# Patient Record
Sex: MALE | Race: OTHER | HISPANIC OR LATINO | URBAN - METROPOLITAN AREA
[De-identification: names, ages, dates, MRNs, and addresses within clinical notes are randomized per-mention and may not be internally consistent; named-entity substitution may affect disease eponyms.]

---

## 2021-04-27 ENCOUNTER — INPATIENT (INPATIENT)
Facility: HOSPITAL | Age: 86
LOS: 3 days | Discharge: HOME | DRG: 312 | End: 2021-05-01
Attending: INTERNAL MEDICINE | Admitting: INTERNAL MEDICINE
Payer: MEDICARE

## 2021-04-27 VITALS
TEMPERATURE: 97 F | HEART RATE: 63 BPM | SYSTOLIC BLOOD PRESSURE: 149 MMHG | WEIGHT: 164.91 LBS | DIASTOLIC BLOOD PRESSURE: 72 MMHG | RESPIRATION RATE: 18 BRPM | OXYGEN SATURATION: 96 %

## 2021-04-27 DIAGNOSIS — U07.1 COVID-19: ICD-10-CM

## 2021-04-27 DIAGNOSIS — R55 SYNCOPE AND COLLAPSE: ICD-10-CM

## 2021-04-27 DIAGNOSIS — I10 ESSENTIAL (PRIMARY) HYPERTENSION: ICD-10-CM

## 2021-04-27 DIAGNOSIS — Z29.9 ENCOUNTER FOR PROPHYLACTIC MEASURES, UNSPECIFIED: ICD-10-CM

## 2021-04-27 LAB
ALBUMIN SERPL ELPH-MCNC: 3.5 G/DL — SIGNIFICANT CHANGE UP (ref 3.5–5)
ALP SERPL-CCNC: 76 U/L — SIGNIFICANT CHANGE UP (ref 40–120)
ALT FLD-CCNC: 31 U/L DA — SIGNIFICANT CHANGE UP (ref 10–60)
ANION GAP SERPL CALC-SCNC: 7 MMOL/L — SIGNIFICANT CHANGE UP (ref 5–17)
APTT BLD: 30.6 SEC — SIGNIFICANT CHANGE UP (ref 27.5–35.5)
AST SERPL-CCNC: 37 U/L — SIGNIFICANT CHANGE UP (ref 10–40)
BASOPHILS # BLD AUTO: 0.01 K/UL — SIGNIFICANT CHANGE UP (ref 0–0.2)
BASOPHILS NFR BLD AUTO: 0.3 % — SIGNIFICANT CHANGE UP (ref 0–2)
BILIRUB SERPL-MCNC: 0.3 MG/DL — SIGNIFICANT CHANGE UP (ref 0.2–1.2)
BUN SERPL-MCNC: 18 MG/DL — SIGNIFICANT CHANGE UP (ref 7–18)
CALCIUM SERPL-MCNC: 8.3 MG/DL — LOW (ref 8.4–10.5)
CHLORIDE SERPL-SCNC: 102 MMOL/L — SIGNIFICANT CHANGE UP (ref 96–108)
CO2 SERPL-SCNC: 27 MMOL/L — SIGNIFICANT CHANGE UP (ref 22–31)
CREAT SERPL-MCNC: 1.04 MG/DL — SIGNIFICANT CHANGE UP (ref 0.5–1.3)
EOSINOPHIL # BLD AUTO: 0 K/UL — SIGNIFICANT CHANGE UP (ref 0–0.5)
EOSINOPHIL NFR BLD AUTO: 0 % — SIGNIFICANT CHANGE UP (ref 0–6)
GLUCOSE SERPL-MCNC: 101 MG/DL — HIGH (ref 70–99)
HCT VFR BLD CALC: 44.5 % — SIGNIFICANT CHANGE UP (ref 39–50)
HGB BLD-MCNC: 14.6 G/DL — SIGNIFICANT CHANGE UP (ref 13–17)
IMM GRANULOCYTES NFR BLD AUTO: 0.5 % — SIGNIFICANT CHANGE UP (ref 0–1.5)
INR BLD: 1.13 RATIO — SIGNIFICANT CHANGE UP (ref 0.88–1.16)
LYMPHOCYTES # BLD AUTO: 0.86 K/UL — LOW (ref 1–3.3)
LYMPHOCYTES # BLD AUTO: 21.6 % — SIGNIFICANT CHANGE UP (ref 13–44)
MCHC RBC-ENTMCNC: 30.7 PG — SIGNIFICANT CHANGE UP (ref 27–34)
MCHC RBC-ENTMCNC: 32.8 GM/DL — SIGNIFICANT CHANGE UP (ref 32–36)
MCV RBC AUTO: 93.7 FL — SIGNIFICANT CHANGE UP (ref 80–100)
MONOCYTES # BLD AUTO: 0.57 K/UL — SIGNIFICANT CHANGE UP (ref 0–0.9)
MONOCYTES NFR BLD AUTO: 14.3 % — HIGH (ref 2–14)
NEUTROPHILS # BLD AUTO: 2.52 K/UL — SIGNIFICANT CHANGE UP (ref 1.8–7.4)
NEUTROPHILS NFR BLD AUTO: 63.3 % — SIGNIFICANT CHANGE UP (ref 43–77)
NRBC # BLD: 0 /100 WBCS — SIGNIFICANT CHANGE UP (ref 0–0)
PLATELET # BLD AUTO: 186 K/UL — SIGNIFICANT CHANGE UP (ref 150–400)
POTASSIUM SERPL-MCNC: 3.3 MMOL/L — LOW (ref 3.5–5.3)
POTASSIUM SERPL-SCNC: 3.3 MMOL/L — LOW (ref 3.5–5.3)
PROT SERPL-MCNC: 7.4 G/DL — SIGNIFICANT CHANGE UP (ref 6–8.3)
PROTHROM AB SERPL-ACNC: 13.4 SEC — SIGNIFICANT CHANGE UP (ref 10.6–13.6)
RBC # BLD: 4.75 M/UL — SIGNIFICANT CHANGE UP (ref 4.2–5.8)
RBC # FLD: 13.5 % — SIGNIFICANT CHANGE UP (ref 10.3–14.5)
SARS-COV-2 RNA SPEC QL NAA+PROBE: DETECTED
SODIUM SERPL-SCNC: 136 MMOL/L — SIGNIFICANT CHANGE UP (ref 135–145)
TROPONIN I SERPL-MCNC: 0.03 NG/ML — SIGNIFICANT CHANGE UP (ref 0–0.04)
WBC # BLD: 3.98 K/UL — SIGNIFICANT CHANGE UP (ref 3.8–10.5)
WBC # FLD AUTO: 3.98 K/UL — SIGNIFICANT CHANGE UP (ref 3.8–10.5)

## 2021-04-27 PROCEDURE — 70450 CT HEAD/BRAIN W/O DYE: CPT | Mod: 26,MA

## 2021-04-27 PROCEDURE — 99285 EMERGENCY DEPT VISIT HI MDM: CPT

## 2021-04-27 PROCEDURE — 71045 X-RAY EXAM CHEST 1 VIEW: CPT | Mod: 26

## 2021-04-27 PROCEDURE — 99223 1ST HOSP IP/OBS HIGH 75: CPT

## 2021-04-27 RX ORDER — ENOXAPARIN SODIUM 100 MG/ML
40 INJECTION SUBCUTANEOUS DAILY
Refills: 0 | Status: DISCONTINUED | OUTPATIENT
Start: 2021-04-27 | End: 2021-05-01

## 2021-04-27 RX ORDER — TETANUS TOXOID, REDUCED DIPHTHERIA TOXOID AND ACELLULAR PERTUSSIS VACCINE, ADSORBED 5; 2.5; 8; 8; 2.5 [IU]/.5ML; [IU]/.5ML; UG/.5ML; UG/.5ML; UG/.5ML
0.5 SUSPENSION INTRAMUSCULAR ONCE
Refills: 0 | Status: COMPLETED | OUTPATIENT
Start: 2021-04-27 | End: 2021-04-27

## 2021-04-27 RX ADMIN — TETANUS TOXOID, REDUCED DIPHTHERIA TOXOID AND ACELLULAR PERTUSSIS VACCINE, ADSORBED 0.5 MILLILITER(S): 5; 2.5; 8; 8; 2.5 SUSPENSION INTRAMUSCULAR at 17:35

## 2021-04-27 RX ADMIN — ENOXAPARIN SODIUM 40 MILLIGRAM(S): 100 INJECTION SUBCUTANEOUS at 22:00

## 2021-04-27 NOTE — H&P ADULT - NSHPPHYSICALEXAM_GEN_ALL_CORE
Vital Signs (24 Hrs):  T(C): 36.7 (04-27-21 @ 22:01), Max: 36.7 (04-27-21 @ 22:01)  HR: 80 (04-27-21 @ 22:01) (63 - 80)  BP: 140/70 (04-27-21 @ 22:01) (140/70 - 149/72)  RR: 17 (04-27-21 @ 22:01) (17 - 18)  SpO2: 98% (04-27-21 @ 22:01) (96% - 98%)

## 2021-04-27 NOTE — H&P ADULT - PROBLEM SELECTOR PLAN 2
Was found to be covid positive  No symptoms at present  Will send in inflammatory markers  monitor respiratory status

## 2021-04-27 NOTE — H&P ADULT - ATTENDING COMMENTS
Patient is an 85 year old male with a PMH of HTN who was BIBEMS due to syncope.  ( ID: [id number])  Patient states that as he was sitting in a chair at home watching television, he abruptly lost consciousness and fell to the floor.  Patient endorses that his cousin witnessed the entire event and that he was "asleep" for approximately 10 seconds.  Patient denies any bowel/bladder incontinence.    At time of examination in the ED, patient denies any headache, dizziness, chest pain, palpitations, shortness of breath, abdominal pain, nausea/vomiting/diarrhea.    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 No mass effect or hemorrhage is seen. Moderate microvascular type white matter changes are seen.  -Orthostatic Vital Signs  -Will send Prolactin, CK level  -TTE (with attention to mPAP)  -Patient would likely benefit from MRI/MRA Brain  -Will ask Neurology to evaluate patient for further recommendations    Hypokalemia:  -Will continue to replete as necessary and will recheck    Non-Severe COVID Infection:  -COVID= Detected  -At time of examination in the ED, patient is not requiring supplemental oxygen.  Therefore, patient can be categorized as Non-Severe Disease.    -Will start patient on Lovenox  -Will also start patient on Albuterol MDI Q2H PRN  -Awaiting ESR, CRP, Procalcitonin to determine need for antimicrobials, though will hold for now  -Will continue to trend d-dimer, CRP, LDH, Troponin, Ferritin, CPK  -Tylenol PRN for fever  -Will continue to provide patient with any and all additional supportive care as necessary  -Will ask Infectious Disease to evaluate patient for further recommendations, such as Remdesivir and/or steroids    Hypocalcemia:  -Will send Parathyroid Level, Ionized Calcium, Vitamin D    HTN:  -Will resume patient's home medications  -Vital Signs Q4H    GI/DVT PPx:  -Heparin  -Pepcid Patient is an 85 year old male with a PMH of HTN who was BIBEMS due to syncope.  ( ID: [id number])  Patient states that as he was sitting in a chair at home watching television, he abruptly lost consciousness and fell to the floor.  Patient endorses that his cousin witnessed the entire event and that he was "asleep" for approximately 10 seconds.  Patient denies any bowel/bladder incontinence, tonic/clonic activity.    At time of examination in the ED, patient denies any headache, dizziness, chest pain, palpitations, shortness of breath, abdominal pain, nausea/vomiting/diarrhea.    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 No mass effect or hemorrhage is seen. Moderate microvascular type white matter changes are seen.  -Orthostatic Vital Signs  -Will send Prolactin, CK level  -TTE (with attention to mPAP)  -Patient would likely benefit from MRI/MRA Brain  -Will ask Neurology to evaluate patient for further recommendations    Hypokalemia:  -Will continue to replete as necessary and will recheck    Non-Severe COVID Infection:  -COVID= Detected  -At time of examination in the ED, patient is not requiring supplemental oxygen.  Therefore, patient can be categorized as Non-Severe Disease.    -Will start patient on Lovenox  -Will also start patient on Albuterol MDI Q2H PRN  -Awaiting ESR, CRP, Procalcitonin to determine need for antimicrobials, though will hold for now  -Will continue to trend d-dimer, CRP, LDH, Troponin, Ferritin, CPK  -Tylenol PRN for fever  -Will continue to provide patient with any and all additional supportive care as necessary  -Will ask Infectious Disease to evaluate patient for further recommendations, such as Remdesivir and/or steroids    Hypocalcemia:  -Will send Parathyroid Level, Ionized Calcium, Vitamin D    HTN:  -Will resume patient's home medications  -Vital Signs Q4H    GI/DVT PPx:  -Heparin  -Pepcid

## 2021-04-27 NOTE — ED PROVIDER NOTE - CLINICAL SUMMARY MEDICAL DECISION MAKING FREE TEXT BOX
Patient presenting for syncope. noting head injury., will obtain lab, ct, assess for acs, ed obs and reassess

## 2021-04-27 NOTE — ED PROVIDER NOTE - OBJECTIVE STATEMENT
interpretor id [id number]: 85 y.o w/ pmh of htn presenting s/p syncope. endorses that he was sitting when he lost conciousness. required his nephew to carry him. denies n, v, cp, headache, focal weakness, numbness.

## 2021-04-27 NOTE — ED ADULT NURSE NOTE - OBJECTIVE STATEMENT
Patient states he had a syncopal episode at home. Currently alert and oriented.  denies any pain. abrasion to left side of head, right elbow and right wrist. Patient only endorses HTN medication.

## 2021-04-27 NOTE — H&P ADULT - HISTORY OF PRESENT ILLNESS
85 year old male with medical history significant of HTN who was BIB EMS due to syncope. Patient states that as he was sitting in a chair at home watching television, he abruptly lost consciousness and fell to the floor. He states that he passed out probably for few seconds. Patient endorses that his cousin witnessed the entire event and that he was "asleep" for approximately 10 seconds. Denies any prodromal symptoms like chest pain, palpitations, dizziness, headache, blurry vision, nausea, vomiting. Patient denies any bowel/bladder incontinence, tonic/clonic activity. Denies fever, abdominal pain, dyspnea, diarrhea, urinary problems.

## 2021-04-27 NOTE — H&P ADULT - PROBLEM SELECTOR PLAN 1
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 No mass effect or hemorrhage is seen. 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 No mass effect or hemorrhage is seen. Moderate microvascular type white matter changes are seen  Admit to tele  Will f/u t2  orthostatic positive  Will do echocardiogram

## 2021-04-27 NOTE — H&P ADULT - ASSESSMENT
85 year old male with medical history significant of HTN who was BIB EMS due to syncope.      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 No mass effect or hemorrhage is seen. Moderate microvascular type white matter changes are seen  COVID positive    Patient is being admitted to [facility] for syncope evaluation

## 2021-04-28 DIAGNOSIS — I95.1 ORTHOSTATIC HYPOTENSION: ICD-10-CM

## 2021-04-28 LAB
24R-OH-CALCIDIOL SERPL-MCNC: 15.7 NG/ML — LOW (ref 30–80)
A1C WITH ESTIMATED AVERAGE GLUCOSE RESULT: 5.8 % — HIGH (ref 4–5.6)
ALBUMIN SERPL ELPH-MCNC: 3.2 G/DL — LOW (ref 3.5–5)
ALP SERPL-CCNC: 74 U/L — SIGNIFICANT CHANGE UP (ref 40–120)
ALT FLD-CCNC: 32 U/L DA — SIGNIFICANT CHANGE UP (ref 10–60)
ANION GAP SERPL CALC-SCNC: 11 MMOL/L — SIGNIFICANT CHANGE UP (ref 5–17)
AST SERPL-CCNC: 33 U/L — SIGNIFICANT CHANGE UP (ref 10–40)
BASOPHILS # BLD AUTO: 0 K/UL — SIGNIFICANT CHANGE UP (ref 0–0.2)
BASOPHILS NFR BLD AUTO: 0 % — SIGNIFICANT CHANGE UP (ref 0–2)
BILIRUB SERPL-MCNC: 0.3 MG/DL — SIGNIFICANT CHANGE UP (ref 0.2–1.2)
BUN SERPL-MCNC: 19 MG/DL — HIGH (ref 7–18)
CA-I BLD-SCNC: 1.1 MMOL/L — LOW (ref 1.12–1.3)
CALCIUM SERPL-MCNC: 8.2 MG/DL — LOW (ref 8.4–10.5)
CALCIUM SERPL-MCNC: 8.5 MG/DL — SIGNIFICANT CHANGE UP (ref 8.4–10.5)
CHLORIDE SERPL-SCNC: 100 MMOL/L — SIGNIFICANT CHANGE UP (ref 96–108)
CHOLEST SERPL-MCNC: 145 MG/DL — SIGNIFICANT CHANGE UP
CO2 SERPL-SCNC: 27 MMOL/L — SIGNIFICANT CHANGE UP (ref 22–31)
COVID-19 SPIKE DOMAIN AB INTERP: POSITIVE
COVID-19 SPIKE DOMAIN ANTIBODY RESULT: 8.12 U/ML — HIGH
CREAT SERPL-MCNC: 0.9 MG/DL — SIGNIFICANT CHANGE UP (ref 0.5–1.3)
CRP SERPL-MCNC: 7 MG/L — HIGH
D DIMER BLD IA.RAPID-MCNC: 224 NG/ML DDU — SIGNIFICANT CHANGE UP
EOSINOPHIL # BLD AUTO: 0.01 K/UL — SIGNIFICANT CHANGE UP (ref 0–0.5)
EOSINOPHIL NFR BLD AUTO: 0.2 % — SIGNIFICANT CHANGE UP (ref 0–6)
ERYTHROCYTE [SEDIMENTATION RATE] IN BLOOD: 8 MM/HR — SIGNIFICANT CHANGE UP (ref 0–20)
ESTIMATED AVERAGE GLUCOSE: 120 MG/DL — HIGH (ref 68–114)
FERRITIN SERPL-MCNC: 186 NG/ML — SIGNIFICANT CHANGE UP (ref 30–400)
FOLATE SERPL-MCNC: 16.3 NG/ML — SIGNIFICANT CHANGE UP
GLUCOSE SERPL-MCNC: 107 MG/DL — HIGH (ref 70–99)
HCT VFR BLD CALC: 44.6 % — SIGNIFICANT CHANGE UP (ref 39–50)
HDLC SERPL-MCNC: 43 MG/DL — SIGNIFICANT CHANGE UP
HGB BLD-MCNC: 14.6 G/DL — SIGNIFICANT CHANGE UP (ref 13–17)
IMM GRANULOCYTES NFR BLD AUTO: 0.4 % — SIGNIFICANT CHANGE UP (ref 0–1.5)
INR BLD: 1.15 RATIO — SIGNIFICANT CHANGE UP (ref 0.88–1.16)
LIPID PNL WITH DIRECT LDL SERPL: 88 MG/DL — SIGNIFICANT CHANGE UP
LYMPHOCYTES # BLD AUTO: 0.74 K/UL — LOW (ref 1–3.3)
LYMPHOCYTES # BLD AUTO: 15.8 % — SIGNIFICANT CHANGE UP (ref 13–44)
MAGNESIUM SERPL-MCNC: 2.2 MG/DL — SIGNIFICANT CHANGE UP (ref 1.6–2.6)
MCHC RBC-ENTMCNC: 30.4 PG — SIGNIFICANT CHANGE UP (ref 27–34)
MCHC RBC-ENTMCNC: 32.7 GM/DL — SIGNIFICANT CHANGE UP (ref 32–36)
MCV RBC AUTO: 92.9 FL — SIGNIFICANT CHANGE UP (ref 80–100)
MONOCYTES # BLD AUTO: 0.56 K/UL — SIGNIFICANT CHANGE UP (ref 0–0.9)
MONOCYTES NFR BLD AUTO: 11.9 % — SIGNIFICANT CHANGE UP (ref 2–14)
NEUTROPHILS # BLD AUTO: 3.36 K/UL — SIGNIFICANT CHANGE UP (ref 1.8–7.4)
NEUTROPHILS NFR BLD AUTO: 71.7 % — SIGNIFICANT CHANGE UP (ref 43–77)
NON HDL CHOLESTEROL: 102 MG/DL — SIGNIFICANT CHANGE UP
NRBC # BLD: 0 /100 WBCS — SIGNIFICANT CHANGE UP (ref 0–0)
PHOSPHATE SERPL-MCNC: 4.1 MG/DL — SIGNIFICANT CHANGE UP (ref 2.5–4.5)
PLATELET # BLD AUTO: 151 K/UL — SIGNIFICANT CHANGE UP (ref 150–400)
POTASSIUM SERPL-MCNC: 3.7 MMOL/L — SIGNIFICANT CHANGE UP (ref 3.5–5.3)
POTASSIUM SERPL-SCNC: 3.7 MMOL/L — SIGNIFICANT CHANGE UP (ref 3.5–5.3)
PROT SERPL-MCNC: 7.4 G/DL — SIGNIFICANT CHANGE UP (ref 6–8.3)
PROTHROM AB SERPL-ACNC: 13.6 SEC — SIGNIFICANT CHANGE UP (ref 10.6–13.6)
PTH-INTACT FLD-MCNC: 87 PG/ML — HIGH (ref 15–65)
RBC # BLD: 4.8 M/UL — SIGNIFICANT CHANGE UP (ref 4.2–5.8)
RBC # FLD: 13.4 % — SIGNIFICANT CHANGE UP (ref 10.3–14.5)
SARS-COV-2 IGG+IGM SERPL QL IA: 8.12 U/ML — HIGH
SARS-COV-2 IGG+IGM SERPL QL IA: POSITIVE
SODIUM SERPL-SCNC: 138 MMOL/L — SIGNIFICANT CHANGE UP (ref 135–145)
TRIGL SERPL-MCNC: 68 MG/DL — SIGNIFICANT CHANGE UP
TROPONIN I SERPL-MCNC: 0.03 NG/ML — SIGNIFICANT CHANGE UP (ref 0–0.04)
TSH SERPL-MCNC: 0.54 UU/ML — SIGNIFICANT CHANGE UP (ref 0.34–4.82)
VIT B12 SERPL-MCNC: <150 PG/ML — LOW (ref 232–1245)
WBC # BLD: 4.69 K/UL — SIGNIFICANT CHANGE UP (ref 3.8–10.5)
WBC # FLD AUTO: 4.69 K/UL — SIGNIFICANT CHANGE UP (ref 3.8–10.5)

## 2021-04-28 PROCEDURE — 99223 1ST HOSP IP/OBS HIGH 75: CPT

## 2021-04-28 PROCEDURE — 93880 EXTRACRANIAL BILAT STUDY: CPT | Mod: 26

## 2021-04-28 PROCEDURE — 99232 SBSQ HOSP IP/OBS MODERATE 35: CPT | Mod: GC

## 2021-04-28 RX ORDER — SODIUM CHLORIDE 9 MG/ML
1000 INJECTION INTRAMUSCULAR; INTRAVENOUS; SUBCUTANEOUS
Refills: 0 | Status: DISCONTINUED | OUTPATIENT
Start: 2021-04-28 | End: 2021-04-28

## 2021-04-28 RX ORDER — SODIUM CHLORIDE 9 MG/ML
1000 INJECTION INTRAMUSCULAR; INTRAVENOUS; SUBCUTANEOUS
Refills: 0 | Status: DISCONTINUED | OUTPATIENT
Start: 2021-04-28 | End: 2021-04-29

## 2021-04-28 RX ORDER — PREGABALIN 225 MG/1
1000 CAPSULE ORAL DAILY
Refills: 0 | Status: DISCONTINUED | OUTPATIENT
Start: 2021-04-28 | End: 2021-04-29

## 2021-04-28 RX ORDER — CHOLECALCIFEROL (VITAMIN D3) 125 MCG
2000 CAPSULE ORAL DAILY
Refills: 0 | Status: DISCONTINUED | OUTPATIENT
Start: 2021-04-28 | End: 2021-04-29

## 2021-04-28 RX ORDER — HYDROCHLOROTHIAZIDE 25 MG
25 TABLET ORAL DAILY
Refills: 0 | Status: DISCONTINUED | OUTPATIENT
Start: 2021-04-28 | End: 2021-04-29

## 2021-04-28 RX ORDER — SODIUM CHLORIDE 9 MG/ML
1000 INJECTION, SOLUTION INTRAVENOUS
Refills: 0 | Status: DISCONTINUED | OUTPATIENT
Start: 2021-04-28 | End: 2021-04-28

## 2021-04-28 RX ADMIN — SODIUM CHLORIDE 75 MILLILITER(S): 9 INJECTION INTRAMUSCULAR; INTRAVENOUS; SUBCUTANEOUS at 10:01

## 2021-04-28 RX ADMIN — ENOXAPARIN SODIUM 40 MILLIGRAM(S): 100 INJECTION SUBCUTANEOUS at 11:39

## 2021-04-28 RX ADMIN — SODIUM CHLORIDE 75 MILLILITER(S): 9 INJECTION, SOLUTION INTRAVENOUS at 06:05

## 2021-04-28 NOTE — CONSULT NOTE ADULT - SUBJECTIVE AND OBJECTIVE BOX
+++++++++++++++++++++++++NOTE NOT COMPLETED++++++++++++++++++++++++++++++++++++++++ +++++++++++++++++++++++++NOTE NOT COMPLETED++++++++++++++++++++++++++++++++++++++++      Patient is a 85y old  Male who presents with a chief complaint of Syncope (28 Apr 2021 10:22)      HPI:  85 year old male with medical history significant of HTN who was BIB EMS due to syncope. Patient states that as he was sitting in a chair at home watching television, he abruptly lost consciousness and fell to the floor. He states that he passed out probably for few seconds. Patient endorses that his cousin witnessed the entire event and that he was "asleep" for approximately 10 seconds. Denies any prodromal symptoms like chest pain, palpitations, dizziness, headache, blurry vision, nausea, vomiting. Patient denies any bowel/bladder incontinence, tonic/clonic activity. Denies fever, abdominal pain, dyspnea, diarrhea, urinary problems.  (27 Apr 2021 22:11)          The patient was last know well at   The patient lives at home/ NH  The patient walks without assistance/ with a cane or walker    Neurological Review of Systems:  No difficulty with language.  No vision loss or double vision.  No dizziness, vertigo or new hearing loss.  No difficulty with speech or swallowing.  No focal weakness.  No focal sensory changes.  No numbness or tingling in the bilateral lower extremities.  No difficulty with balance.  No difficulty with ambulation.      MEDICATIONS  (STANDING):  enoxaparin Injectable 40 milliGRAM(s) SubCutaneous daily  hydrochlorothiazide 25 milliGRAM(s) Oral daily  sodium chloride 0.9%. 1000 milliLiter(s) (75 mL/Hr) IV Continuous <Continuous>    MEDICATIONS  (PRN):    Allergies    No Known Allergies    Intolerances      PAST MEDICAL & SURGICAL HISTORY:  HTN (hypertension)      FAMILY HISTORY:    SOCIAL HISTORY: non smoker/ former smoker/ active smoker    Review of Systems:  Constitutional: No generalized weakness. No fevers or chills.                    Eyes, Ears, Mouth, Throat: No vision loss   Respiratory: No shortness of breath or cough.                                Cardiovascular: No chest pain or palpitations  Gastrointestinal: No nausea or vomiting.                                         Genitourinary: No urinary incontinence or burning on urination.  Musculoskeletal: No joint pain.                                                           Dermatologic: No rash.  Neurological: as per HPI                                                                      Psychiatric: No behavioral problems.  Endocrine: No known hypoglycemia.               Hematologic/Lymphatic: No easy bleeding.    O:  Vital Signs Last 24 Hrs  T(C): 36.9 (28 Apr 2021 07:49), Max: 37.6 (27 Apr 2021 23:40)  T(F): 98.5 (28 Apr 2021 07:49), Max: 99.7 (27 Apr 2021 23:40)  HR: 58 (28 Apr 2021 07:49) (58 - 80)  BP: 146/74 (28 Apr 2021 07:49) (140/70 - 167/73)  BP(mean): --  RR: 18 (28 Apr 2021 07:49) (17 - 18)  SpO2: 96% (28 Apr 2021 07:49) (96% - 99%)    General Exam:   General appearance: No acute distress                 Cardiovascular: Pedal dorsalis pulses intact bilaterally    Neurological Exam:  NIH Stroke Scale (NIHSS):   1a. LOConscious:  0-alert 1-lethargy 2-obtund 3-coma:    _____  1b. LOC Questions:  0-both 1-one 2-none                       _____  1c. LOC Commands:  0-both 1-one 2-none                     _____  2.   Gaze:  0-nl 1-partial 2-conjugate                                _____  3.   Visual:  0-nl 1-part dacia 2-full dacia 3-bilat dacia         _____  4.   Facial Palsy:  0-nl 1-minor 2-part 3-complete             _____  5.   Motor Arm:  0-nl 1-drift 2-effort 3-no effort         Left             _____                              4-no move UN-amputated                     Right  _____  6.   Motor Leg:                                                                 Left   _____                                                                                   Right _____  7.   Ataxia:  0-nl 1-one limb 2-two UN-amp                      _____  8.   Sensory:  0-nl 1-mild 2-severe                                  _____  9.   Language:  0-nl 1-mild 2-severe 3-mute                     _____  10.  Dysarthria:  0-nl 1-mild 2-severe 3-barrier                  _____  11.  Extinction/Inattention:  0-nl 1-mild 2-deep                 _____         TOTAL NIHSS       ________    Mental Status: Oriented to self, date and place.  Attention intact.  No dysarthria, aphasia or neglect.  Knowledge intact.  Registration intact.  Short and long term memory grossly intact.      Cranial Nerves: CN I - not tested.  PERRL, EOMI, VFF, no nystagmus or diplopia.  No APD.  Fundi not visualized bilaterally.  CN V1-3 intact to light touch and pinprick.  No facial asymmetry.  Hearing intact to finger rub bilaterally.  Tongue, uvula and palate midline.  Sternocleidomastoid and Trapezius intact bilaterally.    Motor:   Tone: normal.                  Strength intact throughout  No pronator drift bilaterally                      No dysmetria on finger-nose-finger or heel-shin-heel  No truncal ataxia.  No resting, postural or action tremor.  No myoclonus.    Sensation: intact to light touch, pinprick, vibration and proprioception    Deep Tendon Reflexes: 1+ bilateral biceps, triceps, brachioradialis, knee and ankle  Toes flexor bilaterally    Gait: normal and stable.  Romberg (-).    Other:      LABS:                        14.6   4.69  )-----------( 151      ( 28 Apr 2021 06:43 )             44.6     04-28    138  |  100  |  19<H>  ----------------------------<  107<H>  3.7   |  27  |  0.90    Ca    8.2<L>      28 Apr 2021 06:43  Phos  4.1     04-28  Mg     2.2     04-28    TPro  7.4  /  Alb  3.2<L>  /  TBili  0.3  /  DBili  x   /  AST  33  /  ALT  32  /  AlkPhos  74  04-28    PT/INR - ( 28 Apr 2021 06:43 )   PT: 13.6 sec;   INR: 1.15 ratio         PTT - ( 27 Apr 2021 16:52 )  PTT:30.6 sec      04-28 Chol 145 LDL -- HDL 43 Trig 68      RADIOLOGY & ADDITIONAL STUDIES:  < from: CT Head No Cont (04.27.21 @ 18:23) >    EXAM:  CT BRAIN                            PROCEDURE DATE:  04/27/2021          INTERPRETATION:  INDICATIONS:  s/p fall  .    TECHNIQUE:  Serial axial images were obtained from the skull base to the vertex without intravenous contrast. Coronal and sagittal reformatted images were obtained.    COMPARISON EXAMINATION: None.    FINDINGS:  VENTRICLES AND SULCI:  Prominent in size compatible with age-appropriate volume loss  INTRA-AXIAL:  Areas of encephalomalacia are seen within the left frontoparietal and right parietal regions compatible with areas of chronic MCA distribution infarction. No mass effect or hemorrhage is seen. Moderate microvascular type white matter changes are seen.  EXTRA-AXIAL:  No mass or collection is seen.  VISUALIZED SINUSES:  Mild to moderate mucosal thickening.  VISUALIZED MASTOIDS:  Clear.  CALVARIUM:  Normal.  MISCELLANEOUS:  Left occipital soft tissue swelling  Intraocular lens implants    IMPRESSION:  No mass effect, hemorrhage or evidence of acute intracranial pathology.    Chronic bilateral MCA infarcts.            CHRISTINA FITZGERALD MD; Attending Radiologist  This document has been electronically signed. Apr 27 2021  6:41PM    < end of copied text >   +++++++++++++++++++++++++NOTE NOT COMPLETED++++++++++++++++++++++++++++++++++++++++      Patient is a 85y old  Male who presents with a chief complaint of Syncope (28 Apr 2021 10:22)      HPI:  85 year old male with medical history significant of HTN who was BIB EMS due to syncope. Patient states that as he was sitting in a chair at home watching television, he abruptly lost consciousness and fell to the floor. He states that he passed out probably for few seconds. Patient endorses that his cousin witnessed the entire event and that he was "asleep" for approximately 10 seconds. Denies any prodromal symptoms like chest pain, palpitations, dizziness, headache, blurry vision, nausea, vomiting. Patient denies any bowel/bladder incontinence, tonic/clonic activity. Denies fever, abdominal pain, dyspnea, diarrhea, urinary problems.  (27 Apr 2021 22:11)          The patient was last know well 4/27, time unable to recall  The patient lives at home  The patient walks without assistance  - Zandra # [id number]    Neurological Review of Systems:  No difficulty with language.  No vision loss or double vision.  No dizziness, vertigo or new hearing loss.  No difficulty with speech or swallowing.  (+) Focal weakness.  No focal sensory changes.  No numbness or tingling in the bilateral lower extremities.  No difficulty with balance.  No difficulty with ambulation.      MEDICATIONS  (STANDING):  enoxaparin Injectable 40 milliGRAM(s) SubCutaneous daily  hydrochlorothiazide 25 milliGRAM(s) Oral daily  sodium chloride 0.9%. 1000 milliLiter(s) (75 mL/Hr) IV Continuous <Continuous>    MEDICATIONS  (PRN):    Allergies    No Known Allergies    Intolerances      PAST MEDICAL & SURGICAL HISTORY:  HTN (hypertension)      FAMILY HISTORY:    SOCIAL HISTORY: non smoker    Review of Systems:  Constitutional: No generalized weakness. No fevers or chills                  Eyes, Ears, Mouth, Throat: No vision loss   Respiratory: No shortness of breath or cough                              Cardiovascular: No chest pain or palpitations  Gastrointestinal: No nausea or vomiting                                        Genitourinary: No urinary incontinence or burning on urination  Musculoskeletal: No joint pain                                                      Dermatologic: No rash  Neurological: as per HPI                                                                      Psychiatric: No behavioral problems  Endocrine: No known hypoglycemia             Hematologic/Lymphatic: No easy bleeding    O:  Vital Signs Last 24 Hrs  T(C): 36.9 (28 Apr 2021 07:49), Max: 37.6 (27 Apr 2021 23:40)  T(F): 98.5 (28 Apr 2021 07:49), Max: 99.7 (27 Apr 2021 23:40)  HR: 58 (28 Apr 2021 07:49) (58 - 80)  BP: 146/74 (28 Apr 2021 07:49) (140/70 - 167/73)  RR: 18 (28 Apr 2021 07:49) (17 - 18)  SpO2: 96% (28 Apr 2021 07:49) (96% - 99%)    General Exam:   General appearance: No acute distress                 Cardiovascular: Pedal dorsalis pulses intact bilaterally    Neurological Exam:  NIH Stroke Scale (NIHSS):   1a. LOConscious:  0-alert 1-lethargy 2-obtund 3-coma:    ___0__  1b. LOC Questions:  0-both 1-one 2-none                       __0___  1c. LOC Commands:  0-both 1-one 2-none                     __0__  2.   Gaze:  0-nl 1-partial 2-conjugate                                __0___  3.   Visual:  0-nl 1-part dacia 2-full dacia 3-bilat dacia         ___0__  4.   Facial Palsy:  0-nl 1-minor 2-part 3-complete             __1___  5.   Motor Arm:  0-nl 1-drift 2-effort 3-no effort         Left             _0____                              4-no move UN-amputated                     Right  __0___  6.   Motor Leg:                                                                 Left   ___0__                                                                                   Right _0____  7.   Ataxia:  0-nl 1-one limb 2-two UN-amp                      __0___  8.   Sensory:  0-nl 1-mild 2-severe                                  __0___  9.   Language:  0-nl 1-mild 2-severe 3-mute                     __0___  10.  Dysarthria:  0-nl 1-mild 2-severe 3-barrier                  __0___  11.  Extinction/Inattention:  0-nl 1-mild 2-deep                 ___0__         TOTAL NIHSS       _____1___  MRS=1    Mental Status: Oriented to self, date and place.  Attention intact.  No dysarthria, aphasia or neglect.  Knowledge intact.  Registration intact.  Short and long term memory grossly intact.      Cranial Nerves: CN I - not tested.  PERRL, EOMI, VFF, no nystagmus or diplopia.  No APD.  Fundi not visualized bilaterally.  CN V1-3 intact to light touch.  (+) Facial asymmetry.  Hearing intact to finger rub bilaterally.  Tongue, uvula and palate midline.  Sternocleidomastoid and Trapezius intact bilaterally.    Motor:   Tone: Spastic                   Strength intact throughout  No pronator drift bilaterally                      No dysmetria on finger-nose-finger or heel-shin-heel  No truncal ataxia.  No resting, postural or action tremor.  No myoclonus.    Sensation: Intact to light touch and pinprick     Deep Tendon Reflexes: 1+ bilateral biceps, triceps, brachioradialis.  Mute knees and ankles  (+) Right Babinski.  Left toe mute.    Gait: Normal and stable.  Romberg (-).    Other:      LABS:                        14.6   4.69  )-----------( 151      ( 28 Apr 2021 06:43 )             44.6     04-28    138  |  100  |  19<H>  ----------------------------<  107<H>  3.7   |  27  |  0.90    Ca    8.2<L>      28 Apr 2021 06:43  Phos  4.1     04-28  Mg     2.2     04-28    TPro  7.4  /  Alb  3.2<L>  /  TBili  0.3  /  DBili  x   /  AST  33  /  ALT  32  /  AlkPhos  74  04-28    PT/INR - ( 28 Apr 2021 06:43 )   PT: 13.6 sec;   INR: 1.15 ratio         PTT - ( 27 Apr 2021 16:52 )  PTT:30.6 sec      04-28 Chol 145 LDL -- HDL 43 Trig 68      RADIOLOGY & ADDITIONAL STUDIES:  < from: CT Head No Cont (04.27.21 @ 18:23) >    EXAM:  CT BRAIN                            PROCEDURE DATE:  04/27/2021          INTERPRETATION:  INDICATIONS:  s/p fall  .    TECHNIQUE:  Serial axial images were obtained from the skull base to the vertex without intravenous contrast. Coronal and sagittal reformatted images were obtained.    COMPARISON EXAMINATION: None.    FINDINGS:  VENTRICLES AND SULCI:  Prominent in size compatible with age-appropriate volume loss  INTRA-AXIAL:  Areas of encephalomalacia are seen within the left frontoparietal and right parietal regions compatible with areas of chronic MCA distribution infarction. No mass effect or hemorrhage is seen. Moderate microvascular type white matter changes are seen.  EXTRA-AXIAL:  No mass or collection is seen.  VISUALIZED SINUSES:  Mild to moderate mucosal thickening.  VISUALIZED MASTOIDS:  Clear.  CALVARIUM:  Normal.  MISCELLANEOUS:  Left occipital soft tissue swelling  Intraocular lens implants    IMPRESSION:  No mass effect, hemorrhage or evidence of acute intracranial pathology.    Chronic bilateral MCA infarcts.            CHRISTINA FITZGERALD MD; Attending Radiologist  This document has been electronically signed. Apr 27 2021  6:41PM    < end of copied text >   Patient is a 85y old  Male who presents with a chief complaint of Syncope (28 Apr 2021 10:22)      HPI:  85 year old male with medical history significant of HTN who was BIB EMS due to syncope. Patient states that as he was sitting in a chair at home watching television, he abruptly lost consciousness and fell to the floor. He states that he passed out probably for few seconds. Patient endorses that his cousin witnessed the entire event and that he was "asleep" for approximately 10 seconds. Denies any prodromal symptoms like chest pain, palpitations, dizziness, headache, blurry vision, nausea, vomiting. Patient denies any bowel/bladder incontinence, tonic/clonic activity. Denies fever, abdominal pain, dyspnea, diarrhea, urinary problems.  (27 Apr 2021 22:11)      The patient was last know well 4/27, time unable to recall  The patient lives at home  The patient walks without assistance  - Zandra # [id number]    Neurological Review of Systems:  No difficulty with language.  No vision loss or double vision.  No dizziness, vertigo or new hearing loss.  No difficulty with speech or swallowing.  (+) Focal weakness.  No focal sensory changes.  No numbness or tingling in the bilateral lower extremities.  No difficulty with balance.  No difficulty with ambulation.      MEDICATIONS  (STANDING):  enoxaparin Injectable 40 milliGRAM(s) SubCutaneous daily  hydrochlorothiazide 25 milliGRAM(s) Oral daily  sodium chloride 0.9%. 1000 milliLiter(s) (75 mL/Hr) IV Continuous <Continuous>    MEDICATIONS  (PRN):    Allergies    No Known Allergies    Intolerances      PAST MEDICAL & SURGICAL HISTORY:  HTN (hypertension)      FAMILY HISTORY:    SOCIAL HISTORY: non smoker    Review of Systems:  Constitutional: No generalized weakness. No fevers or chills                  Eyes, Ears, Mouth, Throat: No vision loss   Respiratory: No shortness of breath or cough                              Cardiovascular: No chest pain or palpitations  Gastrointestinal: No nausea or vomiting                                        Genitourinary: No urinary incontinence or burning on urination  Musculoskeletal: No joint pain                                                      Dermatologic: No rash  Neurological: as per HPI                                                                      Psychiatric: No behavioral problems  Endocrine: No known hypoglycemia             Hematologic/Lymphatic: No easy bleeding    O:  Vital Signs Last 24 Hrs  T(C): 36.9 (28 Apr 2021 07:49), Max: 37.6 (27 Apr 2021 23:40)  T(F): 98.5 (28 Apr 2021 07:49), Max: 99.7 (27 Apr 2021 23:40)  HR: 58 (28 Apr 2021 07:49) (58 - 80)  BP: 146/74 (28 Apr 2021 07:49) (140/70 - 167/73)  RR: 18 (28 Apr 2021 07:49) (17 - 18)  SpO2: 96% (28 Apr 2021 07:49) (96% - 99%)    General Exam:   General appearance: No acute distress                 Cardiovascular: Pedal dorsalis pulses intact bilaterally    Neurological Exam:  NIH Stroke Scale (NIHSS):   1a. LOConscious:  0-alert 1-lethargy 2-obtund 3-coma:    ___0__  1b. LOC Questions:  0-both 1-one 2-none                       __0___  1c. LOC Commands:  0-both 1-one 2-none                     __0__  2.   Gaze:  0-nl 1-partial 2-conjugate                                __0___  3.   Visual:  0-nl 1-part dacia 2-full dacia 3-bilat dacia         ___0__  4.   Facial Palsy:  0-nl 1-minor 2-part 3-complete             __1___  5.   Motor Arm:  0-nl 1-drift 2-effort 3-no effort         Left             _0____                              4-no move UN-amputated                     Right  __0___  6.   Motor Leg:                                                                 Left   ___0__                                                                                   Right _0____  7.   Ataxia:  0-nl 1-one limb 2-two UN-amp                      __0___  8.   Sensory:  0-nl 1-mild 2-severe                                  __0___  9.   Language:  0-nl 1-mild 2-severe 3-mute                     __0___  10.  Dysarthria:  0-nl 1-mild 2-severe 3-barrier                  __0___  11.  Extinction/Inattention:  0-nl 1-mild 2-deep                 ___0__         TOTAL NIHSS       _____1___  MRS=1    Mental Status: Oriented to self, date and place.  Attention intact.  No dysarthria, aphasia or neglect.  Knowledge intact.  Registration intact.  Short and long term memory grossly intact.      Cranial Nerves: CN I - not tested.  PERRL, EOMI, VFF, no nystagmus or diplopia.  No APD.  Fundi not visualized bilaterally.  CN V1-3 intact to light touch.  (+) Facial asymmetry.  Hearing intact to finger rub bilaterally.  Tongue, uvula and palate midline.  Sternocleidomastoid and Trapezius intact bilaterally.    Motor:   Tone: Spastic                   Strength intact throughout  No pronator drift bilaterally                      No dysmetria on finger-nose-finger or heel-shin-heel  No truncal ataxia.  No resting, postural or action tremor.  No myoclonus.    Sensation: Intact to light touch and pinprick     Deep Tendon Reflexes: 1+ bilateral biceps, triceps, brachioradialis.  Mute knees and ankles  (+) Right Babinski.  Left toe mute.    Gait: Normal and stable.  Romberg (-).    Other:      LABS:                        14.6   4.69  )-----------( 151      ( 28 Apr 2021 06:43 )             44.6     04-28    138  |  100  |  19<H>  ----------------------------<  107<H>  3.7   |  27  |  0.90    Ca    8.2<L>      28 Apr 2021 06:43  Phos  4.1     04-28  Mg     2.2     04-28    TPro  7.4  /  Alb  3.2<L>  /  TBili  0.3  /  DBili  x   /  AST  33  /  ALT  32  /  AlkPhos  74  04-28    PT/INR - ( 28 Apr 2021 06:43 )   PT: 13.6 sec;   INR: 1.15 ratio         PTT - ( 27 Apr 2021 16:52 )  PTT:30.6 sec      04-28 Chol 145 LDL -- HDL 43 Trig 68      RADIOLOGY & ADDITIONAL STUDIES:  < from: CT Head No Cont (04.27.21 @ 18:23) >    EXAM:  CT BRAIN                            PROCEDURE DATE:  04/27/2021          INTERPRETATION:  INDICATIONS:  s/p fall  .    TECHNIQUE:  Serial axial images were obtained from the skull base to the vertex without intravenous contrast. Coronal and sagittal reformatted images were obtained.    COMPARISON EXAMINATION: None.    FINDINGS:  VENTRICLES AND SULCI:  Prominent in size compatible with age-appropriate volume loss  INTRA-AXIAL:  Areas of encephalomalacia are seen within the left frontoparietal and right parietal regions compatible with areas of chronic MCA distribution infarction. No mass effect or hemorrhage is seen. Moderate microvascular type white matter changes are seen.  EXTRA-AXIAL:  No mass or collection is seen.  VISUALIZED SINUSES:  Mild to moderate mucosal thickening.  VISUALIZED MASTOIDS:  Clear.  CALVARIUM:  Normal.  MISCELLANEOUS:  Left occipital soft tissue swelling  Intraocular lens implants    IMPRESSION:  No mass effect, hemorrhage or evidence of acute intracranial pathology.    Chronic bilateral MCA infarcts.            CHRISTINA FITZGERALD MD; Attending Radiologist  This document has been electronically signed. Apr 27 2021  6:41PM    < end of copied text >

## 2021-04-28 NOTE — CONSULT NOTE ADULT - TIME BILLING
- Review of records, telemetry, vital signs and daily labs.   - General and cardiovascular physical examination.  - Generation of cardiovascular treatment plan.  - Coordination of care.    Patient was seen and examined by me on 04/28/2021,interim events noted,labs and radiology studies reviewed.  Hernan Horta MD,FACC.  [address] [place], [place] [id number].  [phone]

## 2021-04-28 NOTE — PROGRESS NOTE ADULT - SUBJECTIVE AND OBJECTIVE BOX
PGY-1 Progress Note discussed with attending    PAGER #: [-----] TILL 5:00 PM  PLEASE CONTACT ON CALL TEAM:  - On Call Team (Please refer to Miki) FROM 5:00 PM - 8:30PM  - Nightfloat Team FROM 8:30 -7:30 AM    INTERVAL HPI/OVERNIGHT EVENTS: No acute events overnight. Patient was seen and examined using  [id number]. No new complains.     MEDICATIONS:  enoxaparin Injectable 40 milliGRAM(s) SubCutaneous daily  hydrochlorothiazide 25 milliGRAM(s) Oral daily  sodium chloride 0.9%. 1000 milliLiter(s) IV Continuous <Continuous>      REVIEW OF SYSTEMS:  CONSTITUTIONAL: No fever, weight loss, or fatigue  RESPIRATORY: No cough, wheezing, chills or hemoptysis; No shortness of breath  CARDIOVASCULAR: No chest pain, palpitations, dizziness, or leg swelling  GASTROINTESTINAL: No abdominal pain. No nausea, vomiting, or hematemesis; No diarrhea or constipation. No melena or hematochezia.  GENITOURINARY: No dysuria or hematuria, urinary frequency  NEUROLOGICAL: No headaches, memory loss, loss of strength, numbness, or tremors  SKIN: No itching, burning, rashes, or lesions     Vital Signs Last 24 Hrs  T(C): 36.9 (28 Apr 2021 07:49), Max: 37.6 (27 Apr 2021 23:40)  T(F): 98.5 (28 Apr 2021 07:49), Max: 99.7 (27 Apr 2021 23:40)  HR: 58 (28 Apr 2021 07:49) (58 - 80)  BP: 146/74 (28 Apr 2021 07:49) (140/70 - 167/73)  BP(mean): --  RR: 18 (28 Apr 2021 07:49) (17 - 18)  SpO2: 96% (28 Apr 2021 07:49) (96% - 99%)    PHYSICAL EXAMINATION:  GENERAL: NAD, well built  HEAD:  Atraumatic, Normocephalic  ENMT: Missing teeth. Airway patent, Nasal mucosa clear. Mouth with normal mucosa. Throat has no vesicles, no oropharyngeal exudates and uvula is midline.  EYES:  conjunctiva and sclera clear  NECK: Supple, No JVD, Normal thyroid  CHEST/LUNG: Clear to auscultation. Clear to percussion bilaterally; No rales, rhonchi, wheezing, or rubs  HEART: Regular rate and rhythm; No murmurs, rubs, or gallops  ABDOMEN: Soft, Nontender, Nondistended; Bowel sounds present  NERVOUS SYSTEM:  Alert & Oriented X3,    EXTREMITIES:  2+ Peripheral Pulses, No clubbing, cyanosis, or edema  SKIN: warm dry                          14.6   4.69  )-----------( 151      ( 28 Apr 2021 06:43 )             44.6     04-28    138  |  100  |  19<H>  ----------------------------<  107<H>  3.7   |  27  |  0.90    Ca    8.2<L>      28 Apr 2021 06:43  Phos  4.1     04-28  Mg     2.2     04-28    TPro  7.4  /  Alb  3.2<L>  /  TBili  0.3  /  DBili  x   /  AST  33  /  ALT  32  /  AlkPhos  74  04-28    LIVER FUNCTIONS - ( 28 Apr 2021 06:43 )  Alb: 3.2 g/dL / Pro: 7.4 g/dL / ALK PHOS: 74 U/L / ALT: 32 U/L DA / AST: 33 U/L / GGT: x           CARDIAC MARKERS ( 28 Apr 2021 06:43 )  0.033 ng/mL / x     / x     / x     / x      CARDIAC MARKERS ( 27 Apr 2021 16:52 )  0.025 ng/mL / x     / x     / x     / x          PT/INR - ( 28 Apr 2021 06:43 )   PT: 13.6 sec;   INR: 1.15 ratio         PTT - ( 27 Apr 2021 16:52 )  PTT:30.6 sec    CAPILLARY BLOOD GLUCOSE      RADIOLOGY & ADDITIONAL TESTS:

## 2021-04-28 NOTE — CONSULT NOTE ADULT - ASSESSMENT
86yo male  86yo male w/ most likely cardiogenic syncope    Recommendations:    - Consider CV consult and workup for cardiac etiology    - Encourage plentiful hydration to prevent dehydration    - Consider ASA & statin for secondary prevention for the incidental finding of chronic b/l MCA infarcts    - DVT ppx while inpt    - PT as tolerated  86yo male w/ most likely cardiogenic syncope    Recommendations:    - Consider CV consult and workup for cardiac etiology    - Encourage plentiful hydration to prevent dehydration    - Consider ASA & statin for secondary prevention for the incidental finding of chronic b/l MCA infarcts    - DVT ppx while inpt    - PT as tolerated     NEUROLOGY ATTENDING ADDENDUM    I personally interviewed, examined, and participated in the care of this patient, on rounds 4/28/21 with NP Clif Grant.  Pt interviewed in [place] by me.  In addition to or instead of the Hx and findings and plan reported above, or in particular, I note as follows:    Sixth grade education in home country.  Retired supermarket worker.  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  Elevates R upper eyelid normally volitionally.  Mild psychomotor slowing.      Per report of TTE:  "CONCLUSIONS:  1. Mild mitral regurgitation.  2. Normal left ventricular internal dimensions and wall  thicknesses.  3. Mild segmental left ventricular systolic dysfunction.  The inferior and inferolateral walls are  akinetic.   Mild  diastolic dysfunction (stage I).  4. Normal right ventricular size and function."    I note B12 <150; folate 16.3; ESR 8; CRP 7.      I agree with the above recommendations.  In addition:    Methylmalonic acid, homocysteine, intrinsic factor ab, parietal cell ab, syphilis serology, SPEP (I have ordered them).      He was started on oral cyanocobalamin.  This may not be adequate therapy (age, age-related achlorhydria, possible pernicious anemia [without the anemia], meaning intrinsic factor blocking abs, ...).        Only AFTER blood samples have been drawn and sent to the lab:    D/C oral cyanocobalamin    Administer cyanocobalamin 1000mcg IM, folic acid 5mg PO, B complex tab.  Afterwards cyanocobalamin 1000mcg IM weekly x 3, folic acid 2mg PO daily, B complex daily.  In out-Pt follow-up, if either the MMA or Hcy result from here turns out high, need to repeat MMA and Hcy to ascertain if there was a response to the vitamin supplement treatment regimen.  Then manage as appropriate.   86yo male w/ most likely cardiogenic syncope    Recommendations:    - Consider CV consult and workup for cardiac etiology of ischemic infarcts found on head CT.      - Encourage plentiful hydration to prevent dehydration    - Consider ASA & statin for secondary prevention for the incidental finding of chronic b/l MCA infarcts    - DVT ppx while inpt    - PT as tolerated     NEUROLOGY ATTENDING ADDENDUM    I personally interviewed, examined, and participated in the care of this patient, on rounds 4/28/21 with NP Clif Grant.  Pt interviewed in [place] by me.  In addition to or instead of the Hx and findings and plan reported above, or in particular, I note as follows:    Sixth grade education in home country.  Retired supermarket worker.  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  Elevates R upper eyelid normally volitionally.  Mild psychomotor slowing.      Per report of TTE:  "CONCLUSIONS:  1. Mild mitral regurgitation.  2. Normal left ventricular internal dimensions and wall  thicknesses.  3. Mild segmental left ventricular systolic dysfunction.  The inferior and inferolateral walls are  akinetic.   Mild  diastolic dysfunction (stage I).  4. Normal right ventricular size and function."    I note B12 <150; folate 16.3; ESR 8; CRP 7.      I agree with the above recommendations.  In addition:    Methylmalonic acid, homocysteine, intrinsic factor ab, parietal cell ab, syphilis serology, SPEP (I have ordered them).      He was started on oral cyanocobalamin.  This may not be adequate therapy (age, age-related achlorhydria, possible pernicious anemia [without the anemia], meaning intrinsic factor blocking abs, ...).        Only AFTER blood samples have been drawn and sent to the lab:    D/C oral cyanocobalamin    Administer cyanocobalamin 1000mcg IM, folic acid 5mg PO, B complex tab.  Afterwards cyanocobalamin 1000mcg IM weekly x 3, folic acid 2mg PO daily, B complex daily.  In out-Pt follow-up, if either the MMA or Hcy result from here turns out high, need to repeat MMA and Hcy to ascertain if there was a response to the vitamin supplement treatment regimen.  Then manage as appropriate.

## 2021-04-28 NOTE — PHARMACOTHERAPY INTERVENTION NOTE - COMMENTS
Vit D -  15.7  Vit B 12 - <150   Recommended to order ergocalciferol PO 50,000 IU once weekly and cyanocobalamin PO 1000mcg daily.  S/t PGY 1 resident.

## 2021-04-28 NOTE — CONSULT NOTE ADULT - SUBJECTIVE AND OBJECTIVE BOX
PATIENT SEEN AND EXAMINED ON -04/28/2021  DATE OF SERVICE: 04/28/2021 Interim events noted,Labs ,Radiological studies and Cardiology tests reviewed .      History of Present Illness:   85 year old male with medical history significant of HTN who was BIB EMS due to syncope. Patient states that as he was sitting in a chair at home watching television, he abruptly lost consciousness and fell to the floor. He states that he passed out probably for few seconds. Patient endorses that his cousin witnessed the entire event and that he was "asleep" for approximately 10 seconds. Denies any prodromal symptoms like chest pain, palpitations, dizziness, headache, blurry vision, nausea, vomiting. Patient denies any bowel/bladder incontinence, tonic/clonic activity. Denies fever, abdominal pain, dyspnea, diarrhea, urinary problems.       Review of Systems:  Other Review of Systems: All other review of systems negative, except as noted in HPI      Allergies and Intolerances:        Allergies:  	No Known Allergies:         MEDICATIONS:  enoxaparin Injectable 40 milliGRAM(s) SubCutaneous daily  hydrochlorothiazide 25 milliGRAM(s) Oral daily  sodium chloride 0.9%. 1000 milliLiter(s) IV Continuous <Continuous>      REVIEW OF SYSTEMS:  CONSTITUTIONAL: No fever, weight loss, or fatigue  RESPIRATORY: No cough, wheezing, chills or hemoptysis; No shortness of breath  CARDIOVASCULAR: No chest pain, palpitations, dizziness, or leg swelling  GASTROINTESTINAL: No abdominal pain. No nausea, vomiting, or hematemesis; No diarrhea or constipation. No melena or hematochezia.  GENITOURINARY: No dysuria or hematuria, urinary frequency  NEUROLOGICAL: No headaches, memory loss, loss of strength, numbness, or tremors  SKIN: No itching, burning, rashes, or lesions     Vital Signs Last 24 Hrs  T(C): 36.9 (28 Apr 2021 07:49), Max: 37.6 (27 Apr 2021 23:40)  T(F): 98.5 (28 Apr 2021 07:49), Max: 99.7 (27 Apr 2021 23:40)  HR: 58 (28 Apr 2021 07:49) (58 - 80)  BP: 146/74 (28 Apr 2021 07:49) (140/70 - 167/73)  BP(mean): --  RR: 18 (28 Apr 2021 07:49) (17 - 18)  SpO2: 96% (28 Apr 2021 07:49) (96% - 99%)    PHYSICAL EXAMINATION:  GENERAL: NAD, well built  HEAD:  Atraumatic, Normocephalic  ENMT: Missing teeth. Airway patent, Nasal mucosa clear. Mouth with normal mucosa. Throat has no vesicles, no oropharyngeal exudates and uvula is midline.  EYES:  conjunctiva and sclera clear  NECK: Supple, No JVD, Normal thyroid  CHEST/LUNG: Clear to auscultation. Clear to percussion bilaterally; No rales, rhonchi, wheezing, or rubs  HEART: Regular rate and rhythm; No murmurs, rubs, or gallops  ABDOMEN: Soft, Nontender, Nondistended; Bowel sounds present  NERVOUS SYSTEM:  Alert & Oriented X3,    EXTREMITIES:  2+ Peripheral Pulses, No clubbing, cyanosis, or edema  SKIN: warm dry                          14.6   4.69  )-----------( 151      ( 28 Apr 2021 06:43 )             44.6     04-28    138  |  100  |  19<H>  ----------------------------<  107<H>  3.7   |  27  |  0.90    Ca    8.2<L>      28 Apr 2021 06:43  Phos  4.1     04-28  Mg     2.2     04-28    TPro  7.4  /  Alb  3.2<L>  /  TBili  0.3  /  DBili  x   /  AST  33  /  ALT  32  /  AlkPhos  74  04-28    LIVER FUNCTIONS - ( 28 Apr 2021 06:43 )  Alb: 3.2 g/dL / Pro: 7.4 g/dL / ALK PHOS: 74 U/L / ALT: 32 U/L DA / AST: 33 U/L / GGT: x           CARDIAC MARKERS ( 28 Apr 2021 06:43 )  0.033 ng/mL / x     / x     / x     / x      CARDIAC MARKERS ( 27 Apr 2021 16:52 )  0.025 ng/mL / x     / x     / x     / x          PT/INR - ( 28 Apr 2021 06:43 )   PT: 13.6 sec;   INR: 1.15 ratio         PTT - ( 27 Apr 2021 16:52 )  PTT:30.6 sec        Assessment and Plan:   · Assessment	  85 year old male with medical history significant of HTN who was BIB EMS due to syncope.      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 No mass effect or hemorrhage is seen. Moderate microvascular type white matter changes are seen  COVID positive    Patient is being admitted to tele for syncope evaluation.    Problem/Plan - 1:  ·  Problem: Syncope due to orthostatic hypotension.  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 No mass effect or hemorrhage is seen. Moderate microvascular type white matter changes are seen.  Orthostatic positive; Started on IV fluids 0.9% NS at 75cc for 12 hours.    Problem/Plan - 2:  ·  Problem: COVID-19.  Plan: Was found to be covid positive  No symptoms at present  Saturating 99% on room air  Pending in inflammatory markers  Chest X ray showed no infiltrations ( Pending official report )   Monitor respiratory status.     Problem/Plan - 3:  ·  Problem: HTN (hypertension).  Plan: c/w home meds with parameters  on Hydrochlorothiazide 25mg daily  BP is controlled   Monitor BP.     Problem/Plan - 4:  ·  Problem: Prophylactic measure.  Plan: On lovenox for dvt prophylaxis.

## 2021-04-29 LAB
ALBUMIN SERPL ELPH-MCNC: 2.7 G/DL — LOW (ref 3.5–5)
ALP SERPL-CCNC: 65 U/L — SIGNIFICANT CHANGE UP (ref 40–120)
ALT FLD-CCNC: 26 U/L DA — SIGNIFICANT CHANGE UP (ref 10–60)
ANION GAP SERPL CALC-SCNC: 6 MMOL/L — SIGNIFICANT CHANGE UP (ref 5–17)
AST SERPL-CCNC: 28 U/L — SIGNIFICANT CHANGE UP (ref 10–40)
BASOPHILS # BLD AUTO: 0.01 K/UL — SIGNIFICANT CHANGE UP (ref 0–0.2)
BASOPHILS NFR BLD AUTO: 0.2 % — SIGNIFICANT CHANGE UP (ref 0–2)
BILIRUB SERPL-MCNC: 0.3 MG/DL — SIGNIFICANT CHANGE UP (ref 0.2–1.2)
BUN SERPL-MCNC: 18 MG/DL — SIGNIFICANT CHANGE UP (ref 7–18)
CALCIUM SERPL-MCNC: 8 MG/DL — LOW (ref 8.4–10.5)
CHLORIDE SERPL-SCNC: 103 MMOL/L — SIGNIFICANT CHANGE UP (ref 96–108)
CO2 SERPL-SCNC: 29 MMOL/L — SIGNIFICANT CHANGE UP (ref 22–31)
CREAT SERPL-MCNC: 0.78 MG/DL — SIGNIFICANT CHANGE UP (ref 0.5–1.3)
EOSINOPHIL # BLD AUTO: 0 K/UL — SIGNIFICANT CHANGE UP (ref 0–0.5)
EOSINOPHIL NFR BLD AUTO: 0 % — SIGNIFICANT CHANGE UP (ref 0–6)
GLUCOSE SERPL-MCNC: 87 MG/DL — SIGNIFICANT CHANGE UP (ref 70–99)
HCT VFR BLD CALC: 41 % — SIGNIFICANT CHANGE UP (ref 39–50)
HGB BLD-MCNC: 13.3 G/DL — SIGNIFICANT CHANGE UP (ref 13–17)
IMM GRANULOCYTES NFR BLD AUTO: 0.2 % — SIGNIFICANT CHANGE UP (ref 0–1.5)
LYMPHOCYTES # BLD AUTO: 1.32 K/UL — SIGNIFICANT CHANGE UP (ref 1–3.3)
LYMPHOCYTES # BLD AUTO: 31.3 % — SIGNIFICANT CHANGE UP (ref 13–44)
MCHC RBC-ENTMCNC: 30.3 PG — SIGNIFICANT CHANGE UP (ref 27–34)
MCHC RBC-ENTMCNC: 32.4 GM/DL — SIGNIFICANT CHANGE UP (ref 32–36)
MCV RBC AUTO: 93.4 FL — SIGNIFICANT CHANGE UP (ref 80–100)
MONOCYTES # BLD AUTO: 0.41 K/UL — SIGNIFICANT CHANGE UP (ref 0–0.9)
MONOCYTES NFR BLD AUTO: 9.7 % — SIGNIFICANT CHANGE UP (ref 2–14)
MRSA PCR RESULT.: SIGNIFICANT CHANGE UP
NEUTROPHILS # BLD AUTO: 2.47 K/UL — SIGNIFICANT CHANGE UP (ref 1.8–7.4)
NEUTROPHILS NFR BLD AUTO: 58.6 % — SIGNIFICANT CHANGE UP (ref 43–77)
NRBC # BLD: 0 /100 WBCS — SIGNIFICANT CHANGE UP (ref 0–0)
PLATELET # BLD AUTO: 151 K/UL — SIGNIFICANT CHANGE UP (ref 150–400)
POTASSIUM SERPL-MCNC: 3.5 MMOL/L — SIGNIFICANT CHANGE UP (ref 3.5–5.3)
POTASSIUM SERPL-SCNC: 3.5 MMOL/L — SIGNIFICANT CHANGE UP (ref 3.5–5.3)
PROT SERPL-MCNC: 6.5 G/DL — SIGNIFICANT CHANGE UP (ref 6–8.3)
RBC # BLD: 4.39 M/UL — SIGNIFICANT CHANGE UP (ref 4.2–5.8)
RBC # FLD: 13.5 % — SIGNIFICANT CHANGE UP (ref 10.3–14.5)
S AUREUS DNA NOSE QL NAA+PROBE: SIGNIFICANT CHANGE UP
SODIUM SERPL-SCNC: 138 MMOL/L — SIGNIFICANT CHANGE UP (ref 135–145)
WBC # BLD: 4.22 K/UL — SIGNIFICANT CHANGE UP (ref 3.8–10.5)
WBC # FLD AUTO: 4.22 K/UL — SIGNIFICANT CHANGE UP (ref 3.8–10.5)

## 2021-04-29 PROCEDURE — 99233 SBSQ HOSP IP/OBS HIGH 50: CPT | Mod: GC

## 2021-04-29 RX ORDER — ACETAMINOPHEN 500 MG
650 TABLET ORAL EVERY 6 HOURS
Refills: 0 | Status: DISCONTINUED | OUTPATIENT
Start: 2021-04-29 | End: 2021-05-01

## 2021-04-29 RX ORDER — ERGOCALCIFEROL 1.25 MG/1
50000 CAPSULE ORAL
Refills: 0 | Status: DISCONTINUED | OUTPATIENT
Start: 2021-04-29 | End: 2021-04-29

## 2021-04-29 RX ORDER — CALCIUM GLUCONATE 100 MG/ML
2 VIAL (ML) INTRAVENOUS ONCE
Refills: 0 | Status: COMPLETED | OUTPATIENT
Start: 2021-04-29 | End: 2021-04-29

## 2021-04-29 RX ORDER — AMLODIPINE BESYLATE 2.5 MG/1
5 TABLET ORAL DAILY
Refills: 0 | Status: DISCONTINUED | OUTPATIENT
Start: 2021-04-29 | End: 2021-05-01

## 2021-04-29 RX ORDER — FOLIC ACID 0.8 MG
5 TABLET ORAL DAILY
Refills: 0 | Status: DISCONTINUED | OUTPATIENT
Start: 2021-04-29 | End: 2021-05-01

## 2021-04-29 RX ORDER — PREGABALIN 225 MG/1
1000 CAPSULE ORAL DAILY
Refills: 0 | Status: DISCONTINUED | OUTPATIENT
Start: 2021-04-29 | End: 2021-05-01

## 2021-04-29 RX ORDER — SODIUM CHLORIDE 9 MG/ML
1000 INJECTION INTRAMUSCULAR; INTRAVENOUS; SUBCUTANEOUS
Refills: 0 | Status: DISCONTINUED | OUTPATIENT
Start: 2021-04-29 | End: 2021-04-30

## 2021-04-29 RX ORDER — SODIUM CHLORIDE 9 MG/ML
500 INJECTION INTRAMUSCULAR; INTRAVENOUS; SUBCUTANEOUS ONCE
Refills: 0 | Status: COMPLETED | OUTPATIENT
Start: 2021-04-29 | End: 2021-04-29

## 2021-04-29 RX ADMIN — Medication 200 GRAM(S): at 12:19

## 2021-04-29 RX ADMIN — ENOXAPARIN SODIUM 40 MILLIGRAM(S): 100 INJECTION SUBCUTANEOUS at 12:19

## 2021-04-29 RX ADMIN — Medication 650 MILLIGRAM(S): at 19:54

## 2021-04-29 RX ADMIN — ERGOCALCIFEROL 50000 UNIT(S): 1.25 CAPSULE ORAL at 12:19

## 2021-04-29 RX ADMIN — Medication 25 MILLIGRAM(S): at 05:20

## 2021-04-29 RX ADMIN — SODIUM CHLORIDE 500 MILLILITER(S): 9 INJECTION INTRAMUSCULAR; INTRAVENOUS; SUBCUTANEOUS at 09:45

## 2021-04-29 RX ADMIN — Medication 650 MILLIGRAM(S): at 21:21

## 2021-04-29 RX ADMIN — PREGABALIN 1000 MICROGRAM(S): 225 CAPSULE ORAL at 12:19

## 2021-04-29 NOTE — PROGRESS NOTE ADULT - SUBJECTIVE AND OBJECTIVE BOX
PGY-1 Progress Note discussed with attending    PAGER #: [-----] TILL 5:00 PM  PLEASE CONTACT ON CALL TEAM:  - On Call Team (Please refer to Miki) FROM 5:00 PM - 8:30PM  - Nightfloat Team FROM 8:30 -7:30 AM      INTERVAL HPI/OVERNIGHT EVENTS: No acute events overnight.     MEDICATIONS:  amLODIPine   Tablet 5 milliGRAM(s) Oral daily  cyanocobalamin Injectable 1000 MICROGram(s) IntraMuscular daily  enoxaparin Injectable 40 milliGRAM(s) SubCutaneous daily  folic acid 5 milliGRAM(s) Oral daily  Nephro-lesvia 1 Tablet(s) Oral daily  sodium chloride 0.9%. 1000 milliLiter(s) IV Continuous <Continuous>      REVIEW OF SYSTEMS:  CONSTITUTIONAL: No fever, weight loss, or fatigue  RESPIRATORY: No cough, wheezing, chills or hemoptysis; No shortness of breath  CARDIOVASCULAR: No chest pain, palpitations, dizziness, or leg swelling  GASTROINTESTINAL: No abdominal pain. No nausea, vomiting, or hematemesis; No diarrhea or constipation. No melena or hematochezia.  GENITOURINARY: No dysuria or hematuria, urinary frequency  NEUROLOGICAL: No headaches, memory loss, loss of strength, numbness, or tremors  SKIN: No itching, burning, rashes, or lesions     Vital Signs Last 24 Hrs  T(C): 37.8 (29 Apr 2021 11:55), Max: 37.9 (28 Apr 2021 15:53)  T(F): 100.1 (29 Apr 2021 11:55), Max: 100.2 (28 Apr 2021 15:53)  HR: 73 (29 Apr 2021 11:55) (63 - 73)  BP: 133/74 (29 Apr 2021 11:55) (133/74 - 151/69)  BP(mean): --  RR: 18 (29 Apr 2021 11:55) (17 - 18)  SpO2: 99% (29 Apr 2021 11:55) (97% - 100%)    PHYSICAL EXAMINATION:  GENERAL: NAD, well built  HEAD:  Atraumatic, Normocephalic  EYES:  conjunctiva and sclera clear  NECK: Supple, No JVD, Normal thyroid  CHEST/LUNG: Clear to auscultation. Clear to percussion bilaterally; No rales, rhonchi, wheezing, or rubs  HEART: Regular rate and rhythm; No murmurs, rubs, or gallops  ABDOMEN: Soft, Nontender, Nondistended; Bowel sounds present  NERVOUS SYSTEM:  Alert & Oriented X3,    EXTREMITIES:  2+ Peripheral Pulses, No clubbing, cyanosis, or edema  SKIN: warm dry                          13.3   4.22  )-----------( 151      ( 29 Apr 2021 06:36 )             41.0     04-29    138  |  103  |  18  ----------------------------<  87  3.5   |  29  |  0.78    Ca    8.0<L>      29 Apr 2021 06:36  Phos  4.1     04-28  Mg     2.2     04-28    TPro  6.5  /  Alb  2.7<L>  /  TBili  0.3  /  DBili  x   /  AST  28  /  ALT  26  /  AlkPhos  65  04-29    LIVER FUNCTIONS - ( 29 Apr 2021 06:36 )  Alb: 2.7 g/dL / Pro: 6.5 g/dL / ALK PHOS: 65 U/L / ALT: 26 U/L DA / AST: 28 U/L / GGT: x           CARDIAC MARKERS ( 28 Apr 2021 06:43 )  0.033 ng/mL / x     / x     / x     / x      CARDIAC MARKERS ( 27 Apr 2021 16:52 )  0.025 ng/mL / x     / x     / x     / x          PT/INR - ( 28 Apr 2021 06:43 )   PT: 13.6 sec;   INR: 1.15 ratio         PTT - ( 27 Apr 2021 16:52 )  PTT:30.6 sec    CAPILLARY BLOOD GLUCOSE      RADIOLOGY & ADDITIONAL TESTS:                   PGY-1 Progress Note discussed with attending    PAGER #: [-----] TILL 5:00 PM  PLEASE CONTACT ON CALL TEAM:  - On Call Team (Please refer to Miki) FROM 5:00 PM - 8:30PM  - Nightfloat Team FROM 8:30 -7:30 AM      INTERVAL HPI/OVERNIGHT EVENTS: No acute events overnight. Patient is saturating on room air.     MEDICATIONS:  amLODIPine   Tablet 5 milliGRAM(s) Oral daily  cyanocobalamin Injectable 1000 MICROGram(s) IntraMuscular daily  enoxaparin Injectable 40 milliGRAM(s) SubCutaneous daily  folic acid 5 milliGRAM(s) Oral daily  Nephro-lesvia 1 Tablet(s) Oral daily  sodium chloride 0.9%. 1000 milliLiter(s) IV Continuous <Continuous>      REVIEW OF SYSTEMS:  CONSTITUTIONAL: No fever, weight loss, or fatigue  RESPIRATORY: No cough, wheezing, chills or hemoptysis; No shortness of breath  CARDIOVASCULAR: No chest pain, palpitations, dizziness, or leg swelling  GASTROINTESTINAL: No abdominal pain. No nausea, vomiting, or hematemesis; No diarrhea or constipation. No melena or hematochezia.  GENITOURINARY: No dysuria or hematuria, urinary frequency  NEUROLOGICAL: No headaches, memory loss, loss of strength, numbness, or tremors  SKIN: No itching, burning, rashes, or lesions     Vital Signs Last 24 Hrs  T(C): 37.8 (29 Apr 2021 11:55), Max: 37.9 (28 Apr 2021 15:53)  T(F): 100.1 (29 Apr 2021 11:55), Max: 100.2 (28 Apr 2021 15:53)  HR: 73 (29 Apr 2021 11:55) (63 - 73)  BP: 133/74 (29 Apr 2021 11:55) (133/74 - 151/69)  BP(mean): --  RR: 18 (29 Apr 2021 11:55) (17 - 18)  SpO2: 99% (29 Apr 2021 11:55) (97% - 100%)    PHYSICAL EXAMINATION:  GENERAL: NAD, well built  HEAD:  Atraumatic, Normocephalic  EYES:  conjunctiva and sclera clear  NECK: Supple, No JVD, Normal thyroid  CHEST/LUNG: Clear to auscultation. Clear to percussion bilaterally; No rales, rhonchi, wheezing, or rubs  HEART: Regular rate and rhythm; No murmurs, rubs, or gallops  ABDOMEN: Soft, Nontender, Nondistended; Bowel sounds present  NERVOUS SYSTEM:  Alert & Oriented X3,    EXTREMITIES:  2+ Peripheral Pulses, No clubbing, cyanosis, or edema  SKIN: warm dry                          13.3   4.22  )-----------( 151      ( 29 Apr 2021 06:36 )             41.0     04-29    138  |  103  |  18  ----------------------------<  87  3.5   |  29  |  0.78    Ca    8.0<L>      29 Apr 2021 06:36  Phos  4.1     04-28  Mg     2.2     04-28    TPro  6.5  /  Alb  2.7<L>  /  TBili  0.3  /  DBili  x   /  AST  28  /  ALT  26  /  AlkPhos  65  04-29    LIVER FUNCTIONS - ( 29 Apr 2021 06:36 )  Alb: 2.7 g/dL / Pro: 6.5 g/dL / ALK PHOS: 65 U/L / ALT: 26 U/L DA / AST: 28 U/L / GGT: x           CARDIAC MARKERS ( 28 Apr 2021 06:43 )  0.033 ng/mL / x     / x     / x     / x      CARDIAC MARKERS ( 27 Apr 2021 16:52 )  0.025 ng/mL / x     / x     / x     / x          PT/INR - ( 28 Apr 2021 06:43 )   PT: 13.6 sec;   INR: 1.15 ratio         PTT - ( 27 Apr 2021 16:52 )  PTT:30.6 sec    CAPILLARY BLOOD GLUCOSE      RADIOLOGY & ADDITIONAL TESTS:                   PGY-1 Progress Note discussed with attending    PAGER #: [-----] TILL 5:00 PM  PLEASE CONTACT ON CALL TEAM:  - On Call Team (Please refer to Miki) FROM 5:00 PM - 8:30PM  - Nightfloat Team FROM 8:30 -7:30 AM      INTERVAL HPI/OVERNIGHT EVENTS: No acute events overnight. Patient is saturating on room air.     MEDICATIONS:  amLODIPine   Tablet 5 milliGRAM(s) Oral daily  cyanocobalamin Injectable 1000 MICROGram(s) IntraMuscular daily  enoxaparin Injectable 40 milliGRAM(s) SubCutaneous daily  folic acid 5 milliGRAM(s) Oral daily  Nephro-lesvia 1 Tablet(s) Oral daily  sodium chloride 0.9%. 1000 milliLiter(s) IV Continuous <Continuous>      REVIEW OF SYSTEMS:  CONSTITUTIONAL: No fever, weight loss, or fatigue  RESPIRATORY: No cough, wheezing, chills or hemoptysis; No shortness of breath  CARDIOVASCULAR: No chest pain, palpitations, dizziness, or leg swelling  GASTROINTESTINAL: No abdominal pain. No nausea, vomiting, or hematemesis; No diarrhea or constipation. No melena or hematochezia.  GENITOURINARY: No dysuria or hematuria, urinary frequency  NEUROLOGICAL: No headaches, memory loss, loss of strength, numbness, or tremors  SKIN: No itching, burning, rashes, or lesions     Vital Signs Last 24 Hrs  T(C): 37.8 (29 Apr 2021 11:55), Max: 37.9 (28 Apr 2021 15:53)  T(F): 100.1 (29 Apr 2021 11:55), Max: 100.2 (28 Apr 2021 15:53)  HR: 73 (29 Apr 2021 11:55) (63 - 73)  BP: 133/74 (29 Apr 2021 11:55) (133/74 - 151/69)  BP(mean): --  RR: 18 (29 Apr 2021 11:55) (17 - 18)  SpO2: 99% (29 Apr 2021 11:55) (97% - 100%)    PHYSICAL EXAMINATION:  GENERAL: NAD, well built  HEAD: Posterior occiput trauma, Normocephalic  ENMT: Missing teeth. Airway patent, Nasal mucosa clear. Mouth with normal mucosa. Throat has no vesicles, no oropharyngeal exudates and uvula is midline.  EYES:  conjunctiva and sclera clear  NECK: Supple, No JVD, Normal thyroid  CHEST/LUNG: Clear to auscultation. Clear to percussion bilaterally; No rales, rhonchi, wheezing, or rubs  HEART: Regular rate and rhythm; No murmurs, rubs, or gallops  ABDOMEN: Soft, Nontender, Nondistended; Bowel sounds present  NERVOUS SYSTEM:  Alert & Oriented X3,    EXTREMITIES:  2+ Peripheral Pulses, No clubbing, cyanosis, or edema  SKIN: warm dry                          13.3   4.22  )-----------( 151      ( 29 Apr 2021 06:36 )             41.0     04-29    138  |  103  |  18  ----------------------------<  87  3.5   |  29  |  0.78    Ca    8.0<L>      29 Apr 2021 06:36  Phos  4.1     04-28  Mg     2.2     04-28    TPro  6.5  /  Alb  2.7<L>  /  TBili  0.3  /  DBili  x   /  AST  28  /  ALT  26  /  AlkPhos  65  04-29    LIVER FUNCTIONS - ( 29 Apr 2021 06:36 )  Alb: 2.7 g/dL / Pro: 6.5 g/dL / ALK PHOS: 65 U/L / ALT: 26 U/L DA / AST: 28 U/L / GGT: x           CARDIAC MARKERS ( 28 Apr 2021 06:43 )  0.033 ng/mL / x     / x     / x     / x      CARDIAC MARKERS ( 27 Apr 2021 16:52 )  0.025 ng/mL / x     / x     / x     / x          PT/INR - ( 28 Apr 2021 06:43 )   PT: 13.6 sec;   INR: 1.15 ratio         PTT - ( 27 Apr 2021 16:52 )  PTT:30.6 sec    CAPILLARY BLOOD GLUCOSE      RADIOLOGY & ADDITIONAL TESTS:

## 2021-04-29 NOTE — PROGRESS NOTE ADULT - SUBJECTIVE AND OBJECTIVE BOX
PATIENT SEEN AND EXAMINED ON -04/29/2021  DATE OF SERVICE:   04/29/2021  Interim events noted,Labs ,Radiological studies and Cardiology tests reviewed .      HPII:85 year old male with medical history significant of HTN who was BIB EMS due to syncope. Patient states that as he was sitting in a chair at home watching television, he abruptly lost consciousness and fell to the floor. He states that he passed out probably for few seconds.    INTERVAL HPI/OVERNIGHT EVENTS: No acute events overnight. Patient is saturating on room air.     MEDICATIONS:  amLODIPine   Tablet 5 milliGRAM(s) Oral daily  cyanocobalamin Injectable 1000 MICROGram(s) IntraMuscular daily  enoxaparin Injectable 40 milliGRAM(s) SubCutaneous daily  folic acid 5 milliGRAM(s) Oral daily  Nephro-lesvia 1 Tablet(s) Oral daily  sodium chloride 0.9%. 1000 milliLiter(s) IV Continuous <Continuous>      REVIEW OF SYSTEMS:  CONSTITUTIONAL: No fever, weight loss, or fatigue  RESPIRATORY: No cough, wheezing, chills or hemoptysis; No shortness of breath  CARDIOVASCULAR: No chest pain, palpitations, dizziness, or leg swelling  GASTROINTESTINAL: No abdominal pain. No nausea, vomiting, or hematemesis; No diarrhea or constipation. No melena or hematochezia.  GENITOURINARY: No dysuria or hematuria, urinary frequency  NEUROLOGICAL: No headaches, memory loss, loss of strength, numbness, or tremors  SKIN: No itching, burning, rashes, or lesions     Vital Signs Last 24 Hrs  T(C): 37.8 (29 Apr 2021 11:55), Max: 37.9 (28 Apr 2021 15:53)  T(F): 100.1 (29 Apr 2021 11:55), Max: 100.2 (28 Apr 2021 15:53)  HR: 73 (29 Apr 2021 11:55) (63 - 73)  BP: 133/74 (29 Apr 2021 11:55) (133/74 - 151/69)  RR: 18 (29 Apr 2021 11:55) (17 - 18)  SpO2: 99% (29 Apr 2021 11:55) (97% - 100%)    PHYSICAL EXAMINATION:  GENERAL: NAD, well built  HEAD: Posterior occiput trauma, Normocephalic  ENMT: Missing teeth. Airway patent, Nasal mucosa clear. Mouth with normal mucosa. Throat has no vesicles, no oropharyngeal exudates and uvula is midline.  EYES:  conjunctiva and sclera clear  NECK: Supple, No JVD, Normal thyroid  CHEST/LUNG: Clear to auscultation. Clear to percussion bilaterally; No rales, rhonchi, wheezing, or rubs  HEART: Regular rate and rhythm; No murmurs, rubs, or gallops  ABDOMEN: Soft, Nontender, Nondistended; Bowel sounds present  NERVOUS SYSTEM:  Alert & Oriented X3,    EXTREMITIES:  2+ Peripheral Pulses, No clubbing, cyanosis, or edema  SKIN: warm dry                          13.3   4.22  )-----------( 151      ( 29 Apr 2021 06:36 )             41.0     04-29    138  |  103  |  18  ----------------------------<  87  3.5   |  29  |  0.78    Ca    8.0<L>      29 Apr 2021 06:36  Phos  4.1     04-28  Mg     2.2     04-28    TPro  6.5  /  Alb  2.7<L>  /  TBili  0.3  /  DBili  x   /  AST  28  /  ALT  26  /  AlkPhos  65  04-29    LIVER FUNCTIONS - ( 29 Apr 2021 06:36 )  Alb: 2.7 g/dL / Pro: 6.5 g/dL / ALK PHOS: 65 U/L / ALT: 26 U/L DA / AST: 28 U/L / GGT: x           CARDIAC MARKERS ( 28 Apr 2021 06:43 )  0.033 ng/mL / x     / x     / x     / x      CARDIAC MARKERS ( 27 Apr 2021 16:52 )  0.025 ng/mL / x     / x     / x     / x          PT/INR - ( 28 Apr 2021 06:43 )   PT: 13.6 sec;   INR: 1.15 ratio         PTT - ( 27 Apr 2021 16:52 )  PTT:30.6 sec      Assessment and Plan:   · Assessment	  85 year old male with medical history significant of HTN who was BIB EMS due to syncope.      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 No mass effect or hemorrhage is seen. Moderate microvascular type white matter changes are seen  COVID positive    Patient is being admitted to tele for syncope evaluation.    Problem/Plan - 1:  ·  Problem: Syncope due to orthostatic hypotension.  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 No mass effect or hemorrhage is seen. Moderate microvascular type white matter changes are seen.  Orthostatic positive; Started on IV fluids 0.9% NS at 75cc for 12 hours.  Echo showed EF 45-50% with Grade I DD   Continue on IV fluids 0.9% NS   0.9% NS 500cc Bolus IV was given   Will repeat Orthostaics  Patient is still symptomatic.     Problem/Plan - 2:  ·  Problem: COVID-19.  Plan: Was found to be covid positive  No symptoms at present except for low grade fever ( 100.2 ); Tylenol was offered.  Saturating 97% on room air  Chest X ray showed No acute infiltrate.  Monitor respiratory status.     Problem/Plan - 3:  ·  Problem: HTN (hypertension).  Plan: c/w home meds with parameters  Was on Hydrochlorothiazide 25mg daily; Switched to Amlodipine 5mg daily till orthostat improve and will put him back on Hydrothiazide.  BP is controlled   Monitor BP.     Problem/Plan - 4:  ·  Problem: Prophylactic measure.  Plan: On lovenox for dvt prophylaxis.

## 2021-04-30 LAB
% ALBUMIN: 51.9 % — SIGNIFICANT CHANGE UP
% ALPHA 1: 4.7 % — SIGNIFICANT CHANGE UP
% ALPHA 2: 13.5 % — SIGNIFICANT CHANGE UP
% BETA: 12.8 % — SIGNIFICANT CHANGE UP
% GAMMA: 17.1 % — SIGNIFICANT CHANGE UP
ALBUMIN SERPL ELPH-MCNC: 2.8 G/DL — LOW (ref 3.5–5)
ALBUMIN SERPL ELPH-MCNC: 3.4 G/DL — LOW (ref 3.6–5.5)
ALBUMIN/GLOB SERPL ELPH: 1.1 RATIO — SIGNIFICANT CHANGE UP
ALP SERPL-CCNC: 68 U/L — SIGNIFICANT CHANGE UP (ref 40–120)
ALPHA1 GLOB SERPL ELPH-MCNC: 0.3 G/DL — SIGNIFICANT CHANGE UP (ref 0.1–0.4)
ALPHA2 GLOB SERPL ELPH-MCNC: 0.9 G/DL — SIGNIFICANT CHANGE UP (ref 0.5–1)
ALT FLD-CCNC: 27 U/L DA — SIGNIFICANT CHANGE UP (ref 10–60)
ANION GAP SERPL CALC-SCNC: 8 MMOL/L — SIGNIFICANT CHANGE UP (ref 5–17)
AST SERPL-CCNC: 25 U/L — SIGNIFICANT CHANGE UP (ref 10–40)
B-GLOBULIN SERPL ELPH-MCNC: 0.8 G/DL — SIGNIFICANT CHANGE UP (ref 0.5–1)
BASOPHILS # BLD AUTO: 0 K/UL — SIGNIFICANT CHANGE UP (ref 0–0.2)
BASOPHILS NFR BLD AUTO: 0 % — SIGNIFICANT CHANGE UP (ref 0–2)
BILIRUB SERPL-MCNC: 0.4 MG/DL — SIGNIFICANT CHANGE UP (ref 0.2–1.2)
BUN SERPL-MCNC: 16 MG/DL — SIGNIFICANT CHANGE UP (ref 7–18)
CALCIUM SERPL-MCNC: 8.5 MG/DL — SIGNIFICANT CHANGE UP (ref 8.4–10.5)
CHLORIDE SERPL-SCNC: 101 MMOL/L — SIGNIFICANT CHANGE UP (ref 96–108)
CO2 SERPL-SCNC: 29 MMOL/L — SIGNIFICANT CHANGE UP (ref 22–31)
CREAT SERPL-MCNC: 0.79 MG/DL — SIGNIFICANT CHANGE UP (ref 0.5–1.3)
D DIMER BLD IA.RAPID-MCNC: 219 NG/ML DDU — SIGNIFICANT CHANGE UP
EOSINOPHIL # BLD AUTO: 0.01 K/UL — SIGNIFICANT CHANGE UP (ref 0–0.5)
EOSINOPHIL NFR BLD AUTO: 0.2 % — SIGNIFICANT CHANGE UP (ref 0–6)
ERYTHROCYTE [SEDIMENTATION RATE] IN BLOOD: 12 MM/HR — SIGNIFICANT CHANGE UP (ref 0–20)
FERRITIN SERPL-MCNC: 267 NG/ML — SIGNIFICANT CHANGE UP (ref 30–400)
GAMMA GLOBULIN: 1.1 G/DL — SIGNIFICANT CHANGE UP (ref 0.6–1.6)
GLUCOSE SERPL-MCNC: 89 MG/DL — SIGNIFICANT CHANGE UP (ref 70–99)
HCT VFR BLD CALC: 43.8 % — SIGNIFICANT CHANGE UP (ref 39–50)
HGB BLD-MCNC: 14.2 G/DL — SIGNIFICANT CHANGE UP (ref 13–17)
IMM GRANULOCYTES NFR BLD AUTO: 0.5 % — SIGNIFICANT CHANGE UP (ref 0–1.5)
LYMPHOCYTES # BLD AUTO: 1.23 K/UL — SIGNIFICANT CHANGE UP (ref 1–3.3)
LYMPHOCYTES # BLD AUTO: 27.8 % — SIGNIFICANT CHANGE UP (ref 13–44)
MCHC RBC-ENTMCNC: 30.1 PG — SIGNIFICANT CHANGE UP (ref 27–34)
MCHC RBC-ENTMCNC: 32.4 GM/DL — SIGNIFICANT CHANGE UP (ref 32–36)
MCV RBC AUTO: 93 FL — SIGNIFICANT CHANGE UP (ref 80–100)
MONOCYTES # BLD AUTO: 0.51 K/UL — SIGNIFICANT CHANGE UP (ref 0–0.9)
MONOCYTES NFR BLD AUTO: 11.5 % — SIGNIFICANT CHANGE UP (ref 2–14)
NEUTROPHILS # BLD AUTO: 2.66 K/UL — SIGNIFICANT CHANGE UP (ref 1.8–7.4)
NEUTROPHILS NFR BLD AUTO: 60 % — SIGNIFICANT CHANGE UP (ref 43–77)
NRBC # BLD: 0 /100 WBCS — SIGNIFICANT CHANGE UP (ref 0–0)
PLATELET # BLD AUTO: 143 K/UL — LOW (ref 150–400)
POTASSIUM SERPL-MCNC: 3.5 MMOL/L — SIGNIFICANT CHANGE UP (ref 3.5–5.3)
POTASSIUM SERPL-SCNC: 3.5 MMOL/L — SIGNIFICANT CHANGE UP (ref 3.5–5.3)
PROCALCITONIN SERPL-MCNC: 0.11 NG/ML — HIGH (ref 0.02–0.1)
PROT PATTERN SERPL ELPH-IMP: SIGNIFICANT CHANGE UP
PROT SERPL-MCNC: 6.5 G/DL — SIGNIFICANT CHANGE UP (ref 6–8.3)
PROT SERPL-MCNC: 6.9 G/DL — SIGNIFICANT CHANGE UP (ref 6–8.3)
RBC # BLD: 4.71 M/UL — SIGNIFICANT CHANGE UP (ref 4.2–5.8)
RBC # FLD: 13.3 % — SIGNIFICANT CHANGE UP (ref 10.3–14.5)
SODIUM SERPL-SCNC: 138 MMOL/L — SIGNIFICANT CHANGE UP (ref 135–145)
T PALLIDUM AB TITR SER: NEGATIVE — SIGNIFICANT CHANGE UP
WBC # BLD: 4.43 K/UL — SIGNIFICANT CHANGE UP (ref 3.8–10.5)
WBC # FLD AUTO: 4.43 K/UL — SIGNIFICANT CHANGE UP (ref 3.8–10.5)

## 2021-04-30 PROCEDURE — 99233 SBSQ HOSP IP/OBS HIGH 50: CPT | Mod: GC

## 2021-04-30 PROCEDURE — 99232 SBSQ HOSP IP/OBS MODERATE 35: CPT

## 2021-04-30 RX ORDER — PREGABALIN 225 MG/1
2 CAPSULE ORAL
Qty: 60 | Refills: 0
Start: 2021-04-30 | End: 2021-05-29

## 2021-04-30 RX ORDER — AMLODIPINE BESYLATE 2.5 MG/1
1 TABLET ORAL
Qty: 30 | Refills: 0
Start: 2021-04-30 | End: 2021-05-29

## 2021-04-30 RX ORDER — SODIUM CHLORIDE 9 MG/ML
500 INJECTION INTRAMUSCULAR; INTRAVENOUS; SUBCUTANEOUS ONCE
Refills: 0 | Status: COMPLETED | OUTPATIENT
Start: 2021-04-30 | End: 2021-04-30

## 2021-04-30 RX ORDER — SODIUM CHLORIDE 9 MG/ML
1000 INJECTION INTRAMUSCULAR; INTRAVENOUS; SUBCUTANEOUS
Refills: 0 | Status: DISCONTINUED | OUTPATIENT
Start: 2021-04-30 | End: 2021-05-01

## 2021-04-30 RX ORDER — FOLIC ACID 0.8 MG
5 TABLET ORAL
Qty: 150 | Refills: 0
Start: 2021-04-30 | End: 2021-05-29

## 2021-04-30 RX ORDER — FOLIC ACID 0.8 MG
2 TABLET ORAL
Qty: 60 | Refills: 0
Start: 2021-04-30 | End: 2021-05-29

## 2021-04-30 RX ORDER — ACETAMINOPHEN 500 MG
2 TABLET ORAL
Qty: 120 | Refills: 0
Start: 2021-04-30 | End: 2021-05-14

## 2021-04-30 RX ADMIN — SODIUM CHLORIDE 75 MILLILITER(S): 9 INJECTION INTRAMUSCULAR; INTRAVENOUS; SUBCUTANEOUS at 08:05

## 2021-04-30 RX ADMIN — Medication 5 MILLIGRAM(S): at 11:07

## 2021-04-30 RX ADMIN — ENOXAPARIN SODIUM 40 MILLIGRAM(S): 100 INJECTION SUBCUTANEOUS at 11:07

## 2021-04-30 RX ADMIN — AMLODIPINE BESYLATE 5 MILLIGRAM(S): 2.5 TABLET ORAL at 05:34

## 2021-04-30 RX ADMIN — Medication 1 TABLET(S): at 11:07

## 2021-04-30 RX ADMIN — PREGABALIN 1000 MICROGRAM(S): 225 CAPSULE ORAL at 11:07

## 2021-04-30 RX ADMIN — SODIUM CHLORIDE 1000 MILLILITER(S): 9 INJECTION INTRAMUSCULAR; INTRAVENOUS; SUBCUTANEOUS at 08:06

## 2021-04-30 RX ADMIN — Medication 650 MILLIGRAM(S): at 10:56

## 2021-04-30 NOTE — CHART NOTE - NSCHARTNOTEFT_GEN_A_CORE
Spoke to Dr. Horta regarding echo finding for the patient. There is plan for stress test. Since patient had coffee this morning. Will stress him outpatient. Spoke to Dr. Horta regarding echo finding for the patient. There is plan for stress test. Since patient had coffee this morning. Will stress him outpatient.    As discussed with Dr. Horta. Will discharge patient on amlodipine 5mg daily and will follow up with Dr. Horta. Probably will start him on ACEIs or BB outpatient. Dr. Horta is aware.

## 2021-04-30 NOTE — PROGRESS NOTE ADULT - SUBJECTIVE AND OBJECTIVE BOX
PGY-1 Progress Note discussed with attending    PAGER #: [-----] TILL 5:00 PM  PLEASE CONTACT ON CALL TEAM:  - On Call Team (Please refer to Miki) FROM 5:00 PM - 8:30PM  - Nightfloat Team FROM 8:30 -7:30 AM    INTERVAL HPI/OVERNIGHT EVENTS: No acute events overnight. Patient is still positive for orthostat hypotension, but asymptomatic.    MEDICATIONS:  acetaminophen   Tablet .. 650 milliGRAM(s) Oral every 6 hours PRN  amLODIPine   Tablet 5 milliGRAM(s) Oral daily  cyanocobalamin Injectable 1000 MICROGram(s) IntraMuscular daily  enoxaparin Injectable 40 milliGRAM(s) SubCutaneous daily  folic acid 5 milliGRAM(s) Oral daily  Nephro-lesvia 1 Tablet(s) Oral daily  sodium chloride 0.9%. 1000 milliLiter(s) IV Continuous <Continuous>      REVIEW OF SYSTEMS:  CONSTITUTIONAL: No fever, weight loss, or fatigue  RESPIRATORY: No cough, wheezing, chills or hemoptysis; No shortness of breath  CARDIOVASCULAR: No chest pain, palpitations, dizziness, or leg swelling  GASTROINTESTINAL: No abdominal pain. No nausea, vomiting, or hematemesis; No diarrhea or constipation. No melena or hematochezia.  GENITOURINARY: No dysuria or hematuria, urinary frequency  NEUROLOGICAL: No headaches, memory loss, loss of strength, numbness, or tremors  SKIN: No itching, burning, rashes, or lesions     Vital Signs Last 24 Hrs  T(C): 37.2 (30 Apr 2021 12:16), Max: 38.6 (29 Apr 2021 21:22)  T(F): 99 (30 Apr 2021 12:16), Max: 101.4 (29 Apr 2021 21:22)  HR: 58 (30 Apr 2021 12:16) (53 - 88)  BP: 139/70 (30 Apr 2021 11:00) (132/50 - 164/74)  BP(mean): --  RR: 16 (30 Apr 2021 11:00) (16 - 18)  SpO2: 98% (30 Apr 2021 11:00) (97% - 100%)    PHYSICAL EXAMINATION:  GENERAL: NAD, well built  HEAD: Posterior occiput trauma, Normocephalic  ENMT: Missing teeth. Airway patent, Nasal mucosa clear. Mouth with normal mucosa. Throat has no vesicles, no oropharyngeal exudates and uvula is midline.  EYES:  conjunctiva and sclera clear  NECK: Supple, No JVD, Normal thyroid  CHEST/LUNG: Clear to auscultation. Clear to percussion bilaterally; No rales, rhonchi, wheezing, or rubs  HEART: Regular rate and rhythm; No murmurs, rubs, or gallops  ABDOMEN: Soft, Nontender, Nondistended; Bowel sounds present  NERVOUS SYSTEM:  Alert & Oriented X3,    EXTREMITIES:  2+ Peripheral Pulses, No clubbing, cyanosis, or edema  SKIN: warm dry                          14.2   4.43  )-----------( 143      ( 30 Apr 2021 06:18 )             43.8     04-30    138  |  101  |  16  ----------------------------<  89  3.5   |  29  |  0.79    Ca    8.5      30 Apr 2021 06:18    TPro  6.9  /  Alb  2.8<L>  /  TBili  0.4  /  DBili  x   /  AST  25  /  ALT  27  /  AlkPhos  68  04-30    LIVER FUNCTIONS - ( 30 Apr 2021 06:18 )  Alb: 2.8 g/dL / Pro: 6.9 g/dL / ALK PHOS: 68 U/L / ALT: 27 U/L DA / AST: 25 U/L / GGT: x                   CAPILLARY BLOOD GLUCOSE      RADIOLOGY & ADDITIONAL TESTS:

## 2021-04-30 NOTE — DISCHARGE NOTE PROVIDER - NSDCMRMEDTOKEN_GEN_ALL_CORE_FT
Actamin 325 mg oral tablet: 2 tab(s) orally every 6 hours, As needed, Temp greater or equal to 38C (100.4F)  amLODIPine 5 mg oral tablet: 1 tab(s) orally once a day  B-12 1000 mcg oral tablet: 2 tab(s) orally once a day   folic acid 1 mg oral tablet: 2 tab(s) orally once a day

## 2021-04-30 NOTE — PROGRESS NOTE ADULT - SUBJECTIVE AND OBJECTIVE BOX
This is in follow-up to the initial neurology consult response of 4/28/21.  Hx and findings as previously noted.      Pt with syncopa episode due to orthostatic hypotension; also has B12 deficiency (without anemia).      Results of recommended lab tests:      Methylmalonic acid (MMA)/homocysteine (Hcy)   - pending  intrinsic factor ab  - pending  parietal cell ab - pending   syphilis serology - neg  SPEP - pending

## 2021-04-30 NOTE — PROGRESS NOTE ADULT - SUBJECTIVE AND OBJECTIVE BOX
PATIENT SEEN AND EXAMINED ON -04/30/2021  DATE OF SERVICE: 04/30/2021    Interim events noted,Labs ,Radiological studies and Cardiology tests reviewed .    INTERVAL HPI/OVERNIGHT EVENTS: No acute events overnight. Patient is still positive for orthostat hypotension, but asymptomatic.    MEDICATIONS:  acetaminophen   Tablet .. 650 milliGRAM(s) Oral every 6 hours PRN  amLODIPine   Tablet 5 milliGRAM(s) Oral daily  cyanocobalamin Injectable 1000 MICROGram(s) IntraMuscular daily  enoxaparin Injectable 40 milliGRAM(s) SubCutaneous daily  folic acid 5 milliGRAM(s) Oral daily  Nephro-lesvia 1 Tablet(s) Oral daily  sodium chloride 0.9%. 1000 milliLiter(s) IV Continuous <Continuous>      REVIEW OF SYSTEMS:  CONSTITUTIONAL: No fever, weight loss, or fatigue  RESPIRATORY: No cough, wheezing, chills or hemoptysis; No shortness of breath  CARDIOVASCULAR: No chest pain, palpitations, dizziness, or leg swelling  GASTROINTESTINAL: No abdominal pain. No nausea, vomiting, or hematemesis; No diarrhea or constipation. No melena or hematochezia.  GENITOURINARY: No dysuria or hematuria, urinary frequency  NEUROLOGICAL: No headaches, memory loss, loss of strength, numbness, or tremors  SKIN: No itching, burning, rashes, or lesions     Vital Signs Last 24 Hrs  T(C): 37.2 (30 Apr 2021 12:16), Max: 38.6 (29 Apr 2021 21:22)  T(F): 99 (30 Apr 2021 12:16), Max: 101.4 (29 Apr 2021 21:22)  HR: 58 (30 Apr 2021 12:16) (53 - 88)  BP: 139/70 (30 Apr 2021 11:00) (132/50 - 164/74)  BP(mean): --  RR: 16 (30 Apr 2021 11:00) (16 - 18)  SpO2: 98% (30 Apr 2021 11:00) (97% - 100%)    PHYSICAL EXAMINATION:  GENERAL: NAD, well built  HEAD: Posterior occiput trauma, Normocephalic  ENMT: Missing teeth. Airway patent, Nasal mucosa clear. Mouth with normal mucosa. Throat has no vesicles, no oropharyngeal exudates and uvula is midline.  EYES:  conjunctiva and sclera clear  NECK: Supple, No JVD, Normal thyroid  CHEST/LUNG: Clear to auscultation. Clear to percussion bilaterally; No rales, rhonchi, wheezing, or rubs  HEART: Regular rate and rhythm; No murmurs, rubs, or gallops  ABDOMEN: Soft, Nontender, Nondistended; Bowel sounds present  NERVOUS SYSTEM:  Alert & Oriented X3,    EXTREMITIES:  2+ Peripheral Pulses, No clubbing, cyanosis, or edema  SKIN: warm dry                          14.2   4.43  )-----------( 143      ( 30 Apr 2021 06:18 )             43.8     04-30    138  |  101  |  16  ----------------------------<  89  3.5   |  29  |  0.79    Ca    8.5      30 Apr 2021 06:18    TPro  6.9  /  Alb  2.8<L>  /  TBili  0.4  /  DBili  x   /  AST  25  /  ALT  27  /  AlkPhos  68  04-30    LIVER FUNCTIONS - ( 30 Apr 2021 06:18 )  Alb: 2.8 g/dL / Pro: 6.9 g/dL / ALK PHOS: 68 U/L / ALT: 27 U/L DA / AST: 25 U/L / GGT: x           Assessment and Plan:   · Assessment	  85 year old male with medical history significant of HTN who was BIB EMS due to syncope.      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 No mass effect or hemorrhage is seen. Moderate microvascular type white matter changes are seen  COVID positive    Patient is being admitted to tele for syncope evaluation.    Problem/Plan - 1:  ·  Problem: Syncope due to orthostatic hypotension.  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 No mass effect or hemorrhage is seen. Moderate microvascular type white matter changes are seen.  Orthostatic positive; Started on IV fluids 0.9% NS at 75cc for 12 hours.  Echo showed EF 45-50% with Grade I DD   Continue on IV fluids 0.9% NS   0.9% NS 500cc Bolus IV was given   4/30, Patient is not symptomatic   4/30, Patient is pending for discharge tomorrow.     Problem/Plan - 2:  ·  Problem: COVID-19.  Plan: Was found to be covid positive  No symptoms at present except for low grade fever ( 100.2 ); Tylenol was offered.  Saturating 97% on room air  Chest X ray showed No acute infiltrate.  Monitor respiratory status  Tylenol PRN for fever.     Problem/Plan - 3:  ·  Problem: HTN (hypertension).  Plan: c/w home meds with parameters  Was on Hydrochlorothiazide 25mg daily; Switched to Amlodipine 5mg daily   BP is controlled   Monitor BP  Patient will follow up with Dr. Horat outpatient for medication review.     Problem/Plan - 4:  ·  Problem: Prophylactic measure.  Plan: On lovenox for dvt prophylaxis.

## 2021-04-30 NOTE — DISCHARGE NOTE PROVIDER - CARE PROVIDER_API CALL
Hernan Horta)  Cardiology  [address] [place] [address]  Phone: [phone]  Fax: [phone]  Follow Up Time:

## 2021-04-30 NOTE — DISCHARGE NOTE NURSING/CASE MANAGEMENT/SOCIAL WORK - PATIENT PORTAL LINK FT
You can access the FollowMyHealth Patient Portal offered by [facility] by registering at the following website: [email or web address]. By joining [facility]’s FollowMyHealth portal, you will also be able to view your health information using other applications (apps) compatible with our system.

## 2021-04-30 NOTE — DISCHARGE NOTE PROVIDER - NSDCCPCAREPLAN_GEN_ALL_CORE_FT
PRINCIPAL DISCHARGE DIAGNOSIS  Diagnosis: Syncope due to orthostatic hypotension  Assessment and Plan of Treatment: You presented to hospital after episode of syncope probably due to orthostatic hypotension. That means your blood pressure drops when you change your position suddenly and may be associated with increase heart rate. IV fluids was started and we checked your orthostatic vitals frequently. We consulted cardiology as well. Carotid doppler didn't show any stenosis that might cause you faint. Echo showed normal ejection fraction but the inferior wall and inferio-lateral wall of hear tare akinetic. Please follow up with Dr. Horta, Cardiologist , outpatient for stress test to check if you have vascular ischemia. Please take your medications as prescribed and follow up with Hernan Vale, outpatient. Address is provided in the note.      SECONDARY DISCHARGE DIAGNOSES  Diagnosis: COVID-19  Assessment and Plan of Treatment: You were tested positive for COVID-19. You were asymtomatic. You are saturating well on room air. Chest Xray didn't show significant abnormalities or acute process. You were running low grade fever due to COVID infcetion. Tylenol was offered. Please isolate yourself in home for total 14 days. If you have shortness of breath, Please call [phone] immediately.    Diagnosis: HTN (hypertension)  Assessment and Plan of Treatment: You have history of hypertesion. You were on Hydrochlorothiazide at home. Due to Orthostat hypotension and hypokalemia, We discontinued it and start on amlodipine 5mg daily. Cardiologist Dr. Horta will follow up with you for possible switch you on another type of hypertenison medications. Please follow up with Dr. Horta outpatient.    Diagnosis: Multiple vitamin deficiency  Assessment and Plan of Treatment: Your labs showed deficiency of vitamin B12 and vitamin D. We offered supplementaions for you. Some workup was sent to check the cause of vitamin deficency. Dr. Garcia is following up. Please see Dr. Garcia outpatient as he will prescribe you certain supplemets based on your vitamin workup result. Please continue on your medications as prescribed and followed up with Dr. garcia, Neurologist out patient.

## 2021-04-30 NOTE — DISCHARGE NOTE PROVIDER - HOSPITAL COURSE
85 year old male with medical history significant of HTN who was BIB EMS due to syncope. Patient states that as he was sitting in a chair at home watching television, he abruptly lost consciousness and fell to the floor. He states that he passed out probably for few seconds. Patient endorses that his cousin witnessed the entire event and that he was "asleep" for approximately 10 seconds. In ED, EKG showed NSR. Troponin was negative. 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 No mass effect or hemorrhage is seen. Moderate microvascular type white matter changes are seen. Patient was found to have positive orthostatic vitals. IV fluids was started. Orthostatic vitals was monitored frequently until patient symptoms improved. Cardio was consulted as well. Echo showed inferior and inferiolateral wall akinetic. Plan for stress test outpatient. Patient was incidentally tested positive for COVID. Patient is asymptomatic, saturating well on room air but had low grade fever. Tylenol was offered. As per attending, Patient is clinically stable for discharge to home and isolate him self for total 14 days.

## 2021-05-01 VITALS
RESPIRATION RATE: 17 BRPM | TEMPERATURE: 100 F | OXYGEN SATURATION: 100 % | HEART RATE: 70 BPM | DIASTOLIC BLOOD PRESSURE: 69 MMHG | SYSTOLIC BLOOD PRESSURE: 141 MMHG

## 2021-05-01 LAB — PCA AB SER-ACNC: SIGNIFICANT CHANGE UP

## 2021-05-01 PROCEDURE — 86769 SARS-COV-2 COVID-19 ANTIBODY: CPT

## 2021-05-01 PROCEDURE — 86780 TREPONEMA PALLIDUM: CPT

## 2021-05-01 PROCEDURE — 83735 ASSAY OF MAGNESIUM: CPT

## 2021-05-01 PROCEDURE — 84484 ASSAY OF TROPONIN QUANT: CPT

## 2021-05-01 PROCEDURE — 82728 ASSAY OF FERRITIN: CPT

## 2021-05-01 PROCEDURE — 87641 MR-STAPH DNA AMP PROBE: CPT

## 2021-05-01 PROCEDURE — 80061 LIPID PANEL: CPT

## 2021-05-01 PROCEDURE — 80053 COMPREHEN METABOLIC PANEL: CPT

## 2021-05-01 PROCEDURE — 99285 EMERGENCY DEPT VISIT HI MDM: CPT

## 2021-05-01 PROCEDURE — 82607 VITAMIN B-12: CPT

## 2021-05-01 PROCEDURE — 93880 EXTRACRANIAL BILAT STUDY: CPT

## 2021-05-01 PROCEDURE — 85379 FIBRIN DEGRADATION QUANT: CPT

## 2021-05-01 PROCEDURE — 83036 HEMOGLOBIN GLYCOSYLATED A1C: CPT

## 2021-05-01 PROCEDURE — 83090 ASSAY OF HOMOCYSTEINE: CPT

## 2021-05-01 PROCEDURE — 93306 TTE W/DOPPLER COMPLETE: CPT

## 2021-05-01 PROCEDURE — 71045 X-RAY EXAM CHEST 1 VIEW: CPT

## 2021-05-01 PROCEDURE — 84443 ASSAY THYROID STIM HORMONE: CPT

## 2021-05-01 PROCEDURE — 86255 FLUORESCENT ANTIBODY SCREEN: CPT

## 2021-05-01 PROCEDURE — 70450 CT HEAD/BRAIN W/O DYE: CPT

## 2021-05-01 PROCEDURE — 84100 ASSAY OF PHOSPHORUS: CPT

## 2021-05-01 PROCEDURE — 87635 SARS-COV-2 COVID-19 AMP PRB: CPT

## 2021-05-01 PROCEDURE — 86140 C-REACTIVE PROTEIN: CPT

## 2021-05-01 PROCEDURE — 82746 ASSAY OF FOLIC ACID SERUM: CPT

## 2021-05-01 PROCEDURE — 90715 TDAP VACCINE 7 YRS/> IM: CPT

## 2021-05-01 PROCEDURE — 85610 PROTHROMBIN TIME: CPT

## 2021-05-01 PROCEDURE — 85730 THROMBOPLASTIN TIME PARTIAL: CPT

## 2021-05-01 PROCEDURE — 83970 ASSAY OF PARATHORMONE: CPT

## 2021-05-01 PROCEDURE — 84155 ASSAY OF PROTEIN SERUM: CPT

## 2021-05-01 PROCEDURE — 82310 ASSAY OF CALCIUM: CPT

## 2021-05-01 PROCEDURE — 85025 COMPLETE CBC W/AUTO DIFF WBC: CPT

## 2021-05-01 PROCEDURE — 82306 VITAMIN D 25 HYDROXY: CPT

## 2021-05-01 PROCEDURE — 99238 HOSP IP/OBS DSCHRG MGMT 30/<: CPT

## 2021-05-01 PROCEDURE — 87640 STAPH A DNA AMP PROBE: CPT

## 2021-05-01 PROCEDURE — 84145 PROCALCITONIN (PCT): CPT

## 2021-05-01 PROCEDURE — 85652 RBC SED RATE AUTOMATED: CPT

## 2021-05-01 PROCEDURE — 82330 ASSAY OF CALCIUM: CPT

## 2021-05-01 PROCEDURE — 84165 PROTEIN E-PHORESIS SERUM: CPT

## 2021-05-01 PROCEDURE — 82962 GLUCOSE BLOOD TEST: CPT

## 2021-05-01 PROCEDURE — 83921 ORGANIC ACID SINGLE QUANT: CPT

## 2021-05-01 PROCEDURE — 90471 IMMUNIZATION ADMIN: CPT

## 2021-05-01 PROCEDURE — 93005 ELECTROCARDIOGRAM TRACING: CPT

## 2021-05-01 PROCEDURE — 36415 COLL VENOUS BLD VENIPUNCTURE: CPT

## 2021-05-01 PROCEDURE — 86340 INTRINSIC FACTOR ANTIBODY: CPT

## 2021-05-01 RX ORDER — PREGABALIN 225 MG/1
2 CAPSULE ORAL
Qty: 60 | Refills: 0
Start: 2021-05-01 | End: 2021-05-30

## 2021-05-01 RX ORDER — AMLODIPINE BESYLATE 2.5 MG/1
1 TABLET ORAL
Qty: 30 | Refills: 0
Start: 2021-05-01 | End: 2021-05-30

## 2021-05-01 RX ORDER — FOLIC ACID 0.8 MG
2 TABLET ORAL
Qty: 60 | Refills: 0
Start: 2021-05-01 | End: 2021-05-30

## 2021-05-01 RX ADMIN — AMLODIPINE BESYLATE 5 MILLIGRAM(S): 2.5 TABLET ORAL at 06:45

## 2021-05-01 RX ADMIN — ENOXAPARIN SODIUM 40 MILLIGRAM(S): 100 INJECTION SUBCUTANEOUS at 11:04

## 2021-05-01 RX ADMIN — Medication 1 TABLET(S): at 11:03

## 2021-05-01 RX ADMIN — Medication 650 MILLIGRAM(S): at 00:09

## 2021-05-01 RX ADMIN — Medication 650 MILLIGRAM(S): at 00:40

## 2021-05-01 RX ADMIN — PREGABALIN 1000 MICROGRAM(S): 225 CAPSULE ORAL at 11:03

## 2021-05-01 RX ADMIN — Medication 5 MILLIGRAM(S): at 11:03

## 2021-05-01 NOTE — PROGRESS NOTE ADULT - SUBJECTIVE AND OBJECTIVE BOX
MEDICAL ATTENDING NOTE  Patient is a 85y old  Male who presents with a chief complaint of Dyspnea (01 May 2021 09:33)      HPI:  85 year old male with medical history significant of HTN who was BIB EMS due to syncope. Patient states that as he was sitting in a chair at home watching television, he abruptly lost consciousness and fell to the floor. He states that he passed out probably for few seconds. Patient endorses that his cousin witnessed the entire event and that he was "asleep" for approximately 10 seconds. Denies any prodromal symptoms like chest pain, palpitations, dizziness, headache, blurry vision, nausea, vomiting. Patient denies any bowel/bladder incontinence, tonic/clonic activity. Denies fever, abdominal pain, dyspnea, diarrhea, urinary problems.  (27 Apr 2021 22:11)      INTERVAL HPI/OVERNIGHT EVENTS: no new complaints, feels well    MEDICATIONS  (STANDING):  amLODIPine   Tablet 5 milliGRAM(s) Oral daily  cyanocobalamin Injectable 1000 MICROGram(s) IntraMuscular daily  enoxaparin Injectable 40 milliGRAM(s) SubCutaneous daily  folic acid 5 milliGRAM(s) Oral daily  Nephro-lesvia 1 Tablet(s) Oral daily  sodium chloride 0.9%. 1000 milliLiter(s) (75 mL/Hr) IV Continuous <Continuous>    MEDICATIONS  (PRN):  acetaminophen   Tablet .. 650 milliGRAM(s) Oral every 6 hours PRN Temp greater or equal to 38C (100.4F)      __________________________________________________  REVIEW OF SYSTEMS:    CONSTITUTIONAL: No fever,   EYES: no acute visual disturbances  NECK: No pain or stiffness  RESPIRATORY: No cough; No shortness of breath  CARDIOVASCULAR: No chest pain, no palpitations  GASTROINTESTINAL: No pain. No nausea or vomiting; No diarrhea   NEUROLOGICAL: No headache or numbness, no tremors  MUSCULOSKELETAL: No joint pain, no muscle pain  GENITOURINARY: no dysuria, no frequency, no hesitancy  PSYCHIATRY: no depression , no anxiety  ALL OTHER  ROS negative        Vital Signs Last 24 Hrs  T(C): 37.7 (01 May 2021 15:29), Max: 38 (01 May 2021 00:13)  T(F): 99.8 (01 May 2021 15:29), Max: 100.4 (01 May 2021 00:13)  HR: 70 (01 May 2021 15:29) (60 - 85)  BP: 141/69 (01 May 2021 15:29) (124/62 - 162/80)  BP(mean): --  RR: 17 (01 May 2021 15:29) (17 - 18)  SpO2: 100% (01 May 2021 15:29) (96% - 100%)    ________________________________________________  PHYSICAL EXAM:  GENERAL: NAD  HEENT: Normocephalic;  conjunctivae and sclerae clear; moist mucous membranes;   NECK : supple  CHEST/LUNG: Clear to auscultation bilaterally with good air entry   HEART: S1 S2  regular; no murmurs, gallops or rubs  ABDOMEN: Soft, Nontender, Nondistended; Bowel sounds present  EXTREMITIES: no cyanosis; no edema; no calf tenderness  SKIN: warm and dry; no rash  NERVOUS SYSTEM:  Awake and alert; Oriented  to place, person and time ; no new deficits    _________________________________________________  LABS:                        14.2   4.43  )-----------( 143      ( 30 Apr 2021 06:18 )             43.8     04-30    138  |  101  |  16  ----------------------------<  89  3.5   |  29  |  0.79    Ca    8.5      30 Apr 2021 06:18    TPro  6.9  /  Alb  2.8<L>  /  TBili  0.4  /  DBili  x   /  AST  25  /  ALT  27  /  AlkPhos  68  04-30        CAPILLARY BLOOD GLUCOSE

## 2021-05-01 NOTE — PROGRESS NOTE ADULT - PROVIDER SPECIALTY LIST ADULT
Internal Medicine
Internal Medicine
Cardiology
Internal Medicine
Neurology
Cardiology
Internal Medicine
Cardiology

## 2021-05-01 NOTE — PROGRESS NOTE ADULT - TIME BILLING
- Review of records, telemetry, vital signs and daily labs.   - General and cardiovascular physical examination.  - Generation of cardiovascular treatment plan.  - Coordination of care.    Patient was seen and examined by me on 04/29/2021,interim events noted,labs and radiology studies reviewed.  Hernan Horta MD,FACC.  [address] [place], [place] [id number].  [phone]
- Review of records, telemetry, vital signs and daily labs.   - General and cardiovascular physical examination.  - Generation of cardiovascular treatment plan.  - Coordination of care.    Patient was seen and examined by me on 04/30/2021,interim events noted,labs and radiology studies reviewed.  Hernan Horta MD,FACC.  [address] [place], [place] [id number].  [phone]
- Review of records, telemetry, vital signs and daily labs.   - General and cardiovascular physical examination.  - Generation of cardiovascular treatment plan.  - Coordination of care.    Patient was seen and examined by me on 05/01/2021,interim events noted,labs and radiology studies reviewed.  Hernan Horta MD,FACC.  [address] [place], [place] [id number].  [phone]

## 2021-05-01 NOTE — PROGRESS NOTE ADULT - PROBLEM SELECTOR PLAN 2
Was found to be covid positive  No symptoms at present except for low grade fever ( 100.2 ); Tylenol was offered.  Saturating 97% on room air  Chest X ray showed No acute infiltrate.  Monitor respiratory status  Tylenol PRN for fever
Was found to be covid positive  No symptoms at present except for low grade fever ( 100.2 ); Tylenol was offered.  Saturating 97% on room air  Chest X ray showed No acute infiltrate.  Monitor respiratory status
Was found to be covid positive  No symptoms at present except for low grade fever ( 100.2 ); Tylenol was offered.  Saturating 97% on room air  Chest X ray showed No acute infiltrate.  Monitor respiratory status  Tylenol PRN for fever
Was found to be covid positive  No symptoms at present  Saturating 99% on room air  Pending in inflammatory markers  Chest X ray showed no infiltrations ( Pending official report )   Monitor respiratory status

## 2021-05-01 NOTE — PROGRESS NOTE ADULT - ASSESSMENT
Recommendations unchanged:    Administer cyanocobalamin 1000mcg IM, folic acid 5mg PO, and  B complex tab (done).      Afterwards cyanocobalamin 1000mcg IM weekly x 3, folic acid 2mg PO daily, B complex daily.      In out-Pt follow-up, if either the MMA or Hcy result from here turns out high, need to repeat MMA and Hcy to ascertain if there was a response to the vitamin supplement treatment regimen.  Then manage as appropriate.    
85 year old male with medical history significant of HTN who was BIB EMS due to syncope.      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 No mass effect or hemorrhage is seen. Moderate microvascular type white matter changes are seen  COVID positive    Patient is being admitted to [facility] for syncope evaluation.
85 year old male with medical history significant of HTN who was BIB EMS due to syncope.      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 No mass effect or hemorrhage is seen. Moderate microvascular type white matter changes are seen  COVID positive    Patient is being admitted to [facility] for syncope evaluation.
85 year old male with medical history significant of HTN who was BIB EMS due to syncope.      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 No mass effect or hemorrhage is seen. Moderate microvascular type white matter changes are seen  COVID positive    Patient is being admitted to [facility] for syncope evaluation.
85 year old male with medical history significant of HTN who was BIB EMS due to syncope.      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 No mass effect or hemorrhage is seen. Moderate microvascular type white matter changes are seen  COVID positive    Patient is being admitted to [facility] for syncope evaluation.

## 2021-05-01 NOTE — PROGRESS NOTE ADULT - PROBLEM SELECTOR PLAN 1
Presented with syncopal episode ( LOC for 10 seconds )   No prodromal symptoms  Trop x2 was negative  EKG NSR  Patient admitted to 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 No mass effect or hemorrhage is seen. Moderate microvascular type white matter changes are seen.  Orthostatic positive; Started on IV fluids 0.9% NS at 75cc for 12 hours.  Echo showed EF 45-50% with Grade I DD   Continue on IV fluids 0.9% NS   0.9% NS 500cc Bolus IV was given   4/30, Patient is not symptomatic   4/30, Patient is pending for discharge tomorrow
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 No mass effect or hemorrhage is seen. Moderate microvascular type white matter changes are seen.  Orthostatic positive; Started on IV fluids 0.9% NS at 75cc for 12 hours.  Echo showed EF 45-50% with Grade I DD   Continue on IV fluids 0.9% NS   0.9% NS 500cc Bolus IV was given   Will repeat Orthostat; Patient is still symptomatic.
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 No mass effect or hemorrhage is seen. Moderate microvascular type white matter changes are seen.  Orthostatic positive; Started on IV fluids 0.9% NS at 75cc for 12 hours.  Will do echocardiogram once cardiology approves it ( due to COVID status )
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 No mass effect or hemorrhage is seen. Moderate microvascular type white matter changes are seen.  Orthostatic positive; Started on IV fluids 0.9% NS at 75cc for 12 hours.  Echo showed EF 45-50% with Grade I DD   Continue on IV fluids 0.9% NS   0.9% NS 500cc Bolus IV was given   4/30, Patient is not symptomatic   4/30, Patient is pending for discharge tomorrow

## 2021-05-01 NOTE — PROGRESS NOTE ADULT - ATTENDING COMMENTS
Patient was interviewed and examined at the bedside earlier today.     He is alert and cooperative, feels well.   Vital Signs Last 24 Hrs  T(C): 37.7 (01 May 2021 15:29), Max: 38 (01 May 2021 00:13)  T(F): 99.8 (01 May 2021 15:29), Max: 100.4 (01 May 2021 00:13)  HR: 70 (01 May 2021 15:29) (60 - 85)  BP: 141/69 (01 May 2021 15:29) (124/62 - 162/80)  BP(mean): --  RR: 17 (01 May 2021 15:29) (17 - 18)  SpO2: 100% (01 May 2021 15:29) (96% - 100%)  Lungs, clear  Cor, RRR  Abdomen, soft  Neurological, intact                        14.2   4.43  )-----------( 143      ( 30 Apr 2021 06:18 )             43.8   04-30    138  |  101  |  16  ----------------------------<  89  3.5   |  29  |  0.79    Ca    8.5      30 Apr 2021 06:18    TPro  6.9  /  Alb  2.8<L>  /  TBili  0.4  /  DBili  x   /  AST  25  /  ALT  27  /  AlkPhos  68  04-30    IMP:  Syncope, likely secondary to orthostatic hypotension, now resolved.           COVID-19, asymptomatic          Hypertension, controlled.   Plan: Discharge home on current antihypertensive medications.  f/u Dr. Horta.
Patient seen/evaluated at bedside on 4/28/21. I agree with the resident progress note/outlined plan of care. My independent findings and conclusions are documented.    84 y/o m w/ pmhx of HTN p/w witnessed syncopal episode while seated. Found to be COVID-19 positive. No radiographic findings. Room air saturations were within normal limits. Found orthostatic positive and currently receiving IVF hydration. Likely vaso-vagal etiology potentially related to underlying COVID infection.    1.syncope: vasovagal with component of orthostasis  2. head trauma--> posterior occiput trauma s/p fall  3. vitamin B12 deficiency  4.Vitamin D deficiency  5. asymptomatic COVID-19 infection  6. HTN    defer HCTZ x at least 24 hours in light of orthostasis/mild hypokalemia-low normal serum potassium  can utilize norvasc for blood pressure management while awaiting f/u electrolytes, repeat orthostatics on 4/29  Monitor on telemetry  TTE--> discuss w/ cardiology  neurology input noted and agree doubt neurologic event  Currently has paucity of symptoms related to COVID. Follow clinically for respiratory status other parameters  possible discharge 4/29/21  DVT ppx  serial inflammatory markers.
.syncope: vasovagal with component of orthostasis  2. head trauma--> posterior occiput trauma s/p fall  3. vitamin B12 deficiency  4.Vitamin D deficiency  5. asymptomatic COVID-19 infection  6. HTN    defer HCTZ x  in light of orthostasis/mild hypokalemia-low normal serum potassium  can utilize norvasc for blood pressure management while awaiting f/u electrolytes, repeat orthostatics on 4/29  -b12 supplementation  Monitor on telemetry  TTE--> discuss w/ cardiology as patient has some wall motion abnormalities  neurology input noted and agree doubt neurologic event  Currently has paucity of symptoms related to COVID. Follow clinically for respiratory status other parameters  DVT ppx  serial inflammatory markers.

## 2021-05-01 NOTE — PROGRESS NOTE ADULT - SUBJECTIVE AND OBJECTIVE BOX
DATE OF SERVICE:05/01/2021  Patient was seen and examined ,interim events noted.Consultant notes ,Labs,Telemetry reviewed by me    PRESENTING CC:Syncope    HPI and HOSPITAL COURSE: HPI:  85 year old male with medical history significant of HTN who was BIB EMS due to syncope. Patient states that as he was sitting in a chair at home watching television, he abruptly lost consciousness and fell to the floor. He states that he passed out probably for few seconds. Patient endorses that his cousin witnessed the entire event and that he was "asleep" for approximately 10 seconds. Denies any prodromal symptoms like chest pain, palpitations, dizziness, headache, blurry vision, nausea, vomiting. Patient denies any bowel/bladder incontinence, tonic/clonic activity. Denies fever, abdominal pain, dyspnea, diarrhea, urinary problems.  (27 Apr 2021 22:11)      INTERIM EVENTS:-No acute events overnight. Patient is still positive for orthostatic  hypotension, but asymptomatic.      PMH -reviewed admission note, no change since admission  Heart Failure: Acute [ ] Chronic [x ] Acute on Chronic [ ] Diastolic [ ] Systolic [x ] Combined Systolic and Diastolic[ ]  IVONNE[ ]  ATN[ ]  CKD I [ ] CKDII [ ] CKD III [ ] CKD IV [ ] CKD V [ ] ESRD[ ]  HTN[ ] CVA[ ] DM[ ] COPD[ ] COVID[ ] AF[ ]  PPM[ ] ICD[ ]    MEDICATIONS  (STANDING):  amLODIPine   Tablet 5 milliGRAM(s) Oral daily  cyanocobalamin Injectable 1000 MICROGram(s) IntraMuscular daily  enoxaparin Injectable 40 milliGRAM(s) SubCutaneous daily  folic acid 5 milliGRAM(s) Oral daily  Nephro-lesvia 1 Tablet(s) Oral daily  sodium chloride 0.9%. 1000 milliLiter(s) (75 mL/Hr) IV Continuous <Continuous>    MEDICATIONS  (PRN):  acetaminophen   Tablet .. 650 milliGRAM(s) Oral every 6 hours PRN Temp greater or equal to 38C (100.4F)            REVIEW OF SYSTEMS:  Constitutional: [ ] fever, [ ]weight loss,  [x ]fatigue  Eyes: [ ] visual changes  Respiratory: [ ]shortness of breath;  [ ] cough, [ ]wheezing, [ ]chills, [ ]hemoptysis  Cardiovascular: [ ] chest pain, [ ]palpitations, [ ]dizziness,  [ ]leg swelling[ ]orthopnea[ ]PND  Gastrointestinal: [ ] abdominal pain, [ ]nausea, [ ]vomiting,  [ ]diarrhea [ ]Constipation [ ]Melena  Genitourinary: [ ] dysuria, [ ] hematuria [ ]Vela  Neurologic: [ ] headaches [ ] tremors[ ]weakness [ ]Paralysis Right[ ] Left[ ]  Skin: [ ] itching, [ ]burning, [ ] rashes  Endocrine: [ ] heat or cold intolerance  Musculoskeletal: [ ] joint pain or swelling; [ ] muscle, back, or extremity pain  Psychiatric: [ ] depression, [ ]anxiety, [ ]mood swings, or [ ]difficulty sleeping  Hematologic: [ ] easy bruising, [ ] bleeding gums    [x] All remaining systems negative except as per above.   [ ]Unable to obtain.    Vital Signs Last 24 Hrs  T(C): 36.7 (01 May 2021 07:48), Max: 38 (01 May 2021 00:13)  T(F): 98 (01 May 2021 07:48), Max: 100.4 (01 May 2021 00:13)  HR: 85 (01 May 2021 07:48) (58 - 85)  BP: 124/62 (01 May 2021 07:48) (122/58 - 142/79)  BP(mean): --  RR: 18 (01 May 2021 07:48) (16 - 18)  SpO2: 97% (01 May 2021 07:48) (96% - 100%)  I&O's Summary    30 Apr 2021 07:01  -  01 May 2021 07:00  --------------------------------------------------------  IN: 1879 mL / OUT: 0 mL / NET: 1879 mL    01 May 2021 07:01  -  01 May 2021 09:34  --------------------------------------------------------  IN: 200 mL / OUT: 0 mL / NET: 200 mL        PHYSICAL EXAM:  General: No acute distress BMI-24  HEENT: EOMI, PERRL  Neck: Supple, [ ] JVD  Lungs: Equal air entry bilaterally; [ ] rales [ ] wheezing [ ] rhonchi  Heart: Regular rate and rhythm; [x ] murmur   2/6 [x ] systolic [ ] diastolic [ ] radiation[ ] rubs [ ]  gallops  Abdomen: Nontender, bowel sounds present  Extremities: No clubbing, cyanosis, [ ] edema [ ]Pulses  equal and intact  Nervous system:  Alert & Oriented X3, no focal deficits  Psychiatric: Normal affect  Skin: No rashes or lesions    LABS:  04-30    138  |  101  |  16  ----------------------------<  89  3.5   |  29  |  0.79    Ca    8.5      30 Apr 2021 06:18    TPro  6.9  /  Alb  2.8<L>  /  TBili  0.4  /  DBili  x   /  AST  25  /  ALT  27  /  AlkPhos  68  04-30    Creatinine Trend: 0.79<--, 0.78<--, 0.90<--, 1.04<--                        14.2   4.43  )-----------( 143      ( 30 Apr 2021 06:18 )             43.8           ECG [my interpretation]:Sinus rhythm at 61 BPM Lateral TWI      ECHO:  Study Date: 4/28/2021  CONCLUSIONS:  1. Mild mitral regurgitation.  2. Normal left ventricular internal dimensions and wall thicknesses.  3. Mild segmental left ventricular systolic dysfunction.  The inferior and inferolateral walls are  akinetic.   Mild diastolic dysfunction (stage I).  4. Normal right ventricular size and function.        IMPRESSION AND PLAN:  85 year old male with medical history significant of HTN who was BIB EMS due to syncope.      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 No mass effect or hemorrhage is seen. Moderate microvascular type white matter changes are seen  COVID positive    Patient is being admitted to tele for syncope evaluation.    Problem/Plan - 1:  ·  Problem: Syncope due to orthostatic hypotension.  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 No mass effect or hemorrhage is seen. Moderate microvascular type white matter changes are seen.  Orthostatic positive; Started on IV fluids 0.9% NS at 75cc for 12 hours.  Echo showed EF 45-50% with Grade I DD   Continue on IV fluids 0.9% NS   0.9% NS 500cc Bolus IV was given     Problem/Plan - 2:  ·  Problem: COVID-19.  Plan: Was found to be covid positive  No symptoms at present except for low grade fever ( 100.2 ); Tylenol was offered.  Saturating 97% on room air  Chest X ray showed No acute infiltrate.  Monitor respiratory status  Tylenol PRN for fever.     Problem/Plan - 3:  ·  Problem: HTN (hypertension).  Plan: c/w home meds with parameters  Was on Hydrochlorothiazide 25mg daily; Switched to Amlodipine 5mg daily   BP is controlled   Monitor BP      Problem/Plan - 4:  ·  Problem: Prophylactic measure.  Plan: On lovenox for dvt prophylaxis.

## 2021-05-01 NOTE — PROGRESS NOTE ADULT - PROBLEM SELECTOR PROBLEM 1
Syncope due to orthostatic hypotension

## 2021-05-01 NOTE — PROGRESS NOTE ADULT - PROBLEM SELECTOR PLAN 3
c/w home meds with parameters  on Hydrochlorothiazide 25mg daily  BP is controlled   Monitor BP
c/w home meds with parameters  Was on Hydrochlorothiazide 25mg daily; Switched to Amlodipine 5mg daily   BP is controlled   Monitor BP  Patient will follow up with Dr. Horta outpatient for medication review
c/w home meds with parameters  Was on Hydrochlorothiazide 25mg daily; Switched to Amlodipine 5mg daily   BP is controlled   Monitor BP  Patient will follow up with Dr. Horta outpatient for medication review
c/w home meds with parameters  Was on Hydrochlorothiazide 25mg daily; Switched to Amlodipine 5mg daily till orthostat improve and will put him back on Hydrothiazide.  BP is controlled   Monitor BP

## 2021-05-01 NOTE — PROGRESS NOTE ADULT - PROBLEM SELECTOR PLAN 4
On lovenox for dvt prophylaxis

## 2021-05-02 ENCOUNTER — FORM ENCOUNTER (OUTPATIENT)
Age: 86
End: 2021-05-02

## 2021-05-03 LAB — IF BLOCK AB SER-ACNC: 1.2 AU/ML — HIGH (ref 0–1.1)

## 2021-05-09 LAB
HOMOCYSTEINE LEVEL: 16.5 UMOL/L — HIGH
METHYLMALONIC ACID LEVEL: 387 NMOL/L — HIGH (ref 87–318)

## 2021-05-19 PROBLEM — Z00.00 ENCOUNTER FOR PREVENTIVE HEALTH EXAMINATION: Status: ACTIVE | Noted: 2021-05-19
